# Patient Record
Sex: FEMALE | Race: WHITE | NOT HISPANIC OR LATINO | Employment: FULL TIME | URBAN - METROPOLITAN AREA
[De-identification: names, ages, dates, MRNs, and addresses within clinical notes are randomized per-mention and may not be internally consistent; named-entity substitution may affect disease eponyms.]

---

## 2017-08-10 ENCOUNTER — APPOINTMENT (OUTPATIENT)
Dept: WOMENS IMAGING | Facility: HOSPITAL | Age: 57
End: 2017-08-10

## 2017-08-10 PROCEDURE — 77067 SCR MAMMO BI INCL CAD: CPT | Performed by: RADIOLOGY

## 2018-08-20 ENCOUNTER — APPOINTMENT (OUTPATIENT)
Dept: WOMENS IMAGING | Facility: HOSPITAL | Age: 58
End: 2018-08-20

## 2018-08-20 PROCEDURE — 77067 SCR MAMMO BI INCL CAD: CPT | Performed by: RADIOLOGY

## 2018-08-20 PROCEDURE — 77063 BREAST TOMOSYNTHESIS BI: CPT | Performed by: RADIOLOGY

## 2019-08-21 ENCOUNTER — APPOINTMENT (OUTPATIENT)
Dept: WOMENS IMAGING | Facility: HOSPITAL | Age: 59
End: 2019-08-21

## 2019-08-21 PROCEDURE — 77067 SCR MAMMO BI INCL CAD: CPT | Performed by: RADIOLOGY

## 2019-08-21 PROCEDURE — 77063 BREAST TOMOSYNTHESIS BI: CPT | Performed by: RADIOLOGY

## 2020-08-26 ENCOUNTER — APPOINTMENT (OUTPATIENT)
Dept: WOMENS IMAGING | Facility: HOSPITAL | Age: 60
End: 2020-08-26

## 2020-08-26 PROCEDURE — 77063 BREAST TOMOSYNTHESIS BI: CPT | Performed by: RADIOLOGY

## 2020-08-26 PROCEDURE — 77067 SCR MAMMO BI INCL CAD: CPT | Performed by: RADIOLOGY

## 2021-04-08 ENCOUNTER — PREP FOR SURGERY (OUTPATIENT)
Dept: OTHER | Facility: HOSPITAL | Age: 61
End: 2021-04-08

## 2021-04-08 DIAGNOSIS — Z12.11 SCREEN FOR COLON CANCER: Primary | ICD-10-CM

## 2021-04-09 PROBLEM — Z12.11 SCREEN FOR COLON CANCER: Status: ACTIVE | Noted: 2021-04-09

## 2021-04-16 ENCOUNTER — TRANSCRIBE ORDERS (OUTPATIENT)
Dept: SLEEP MEDICINE | Facility: HOSPITAL | Age: 61
End: 2021-04-16

## 2021-04-16 DIAGNOSIS — Z01.818 OTHER SPECIFIED PRE-OPERATIVE EXAMINATION: Primary | ICD-10-CM

## 2021-05-03 ENCOUNTER — TELEPHONE (OUTPATIENT)
Dept: SURGERY | Facility: CLINIC | Age: 61
End: 2021-05-03

## 2021-05-08 ENCOUNTER — LAB (OUTPATIENT)
Dept: LAB | Facility: HOSPITAL | Age: 61
End: 2021-05-08

## 2021-05-08 DIAGNOSIS — Z01.818 OTHER SPECIFIED PRE-OPERATIVE EXAMINATION: ICD-10-CM

## 2021-05-08 PROCEDURE — U0004 COV-19 TEST NON-CDC HGH THRU: HCPCS

## 2021-05-08 PROCEDURE — C9803 HOPD COVID-19 SPEC COLLECT: HCPCS

## 2021-05-10 LAB — SARS-COV-2 RNA RESP QL NAA+PROBE: NOT DETECTED

## 2021-05-10 RX ORDER — ATORVASTATIN CALCIUM 20 MG/1
20 TABLET, FILM COATED ORAL DAILY
COMMUNITY

## 2021-05-10 RX ORDER — HYDROCHLOROTHIAZIDE 12.5 MG/1
12.5 TABLET ORAL DAILY
COMMUNITY

## 2021-05-10 RX ORDER — BENAZEPRIL HYDROCHLORIDE 10 MG/1
10 TABLET ORAL DAILY
COMMUNITY

## 2021-05-10 RX ORDER — MULTIPLE VITAMINS W/ MINERALS TAB 9MG-400MCG
1 TAB ORAL DAILY
COMMUNITY

## 2021-05-11 ENCOUNTER — ANESTHESIA EVENT (OUTPATIENT)
Dept: GASTROENTEROLOGY | Facility: HOSPITAL | Age: 61
End: 2021-05-11

## 2021-05-11 ENCOUNTER — HOSPITAL ENCOUNTER (OUTPATIENT)
Facility: HOSPITAL | Age: 61
Setting detail: HOSPITAL OUTPATIENT SURGERY
Discharge: HOME | End: 2021-05-11
Attending: SURGERY | Admitting: SURGERY

## 2021-05-11 ENCOUNTER — ANESTHESIA (OUTPATIENT)
Dept: GASTROENTEROLOGY | Facility: HOSPITAL | Age: 61
End: 2021-05-11

## 2021-05-11 VITALS
DIASTOLIC BLOOD PRESSURE: 77 MMHG | BODY MASS INDEX: 34.25 KG/M2 | HEIGHT: 68 IN | RESPIRATION RATE: 16 BRPM | HEART RATE: 81 BPM | WEIGHT: 226 LBS | OXYGEN SATURATION: 99 % | SYSTOLIC BLOOD PRESSURE: 117 MMHG

## 2021-05-11 DIAGNOSIS — Z12.11 SCREEN FOR COLON CANCER: ICD-10-CM

## 2021-05-11 PROCEDURE — 45380 COLONOSCOPY AND BIOPSY: CPT | Performed by: SURGERY

## 2021-05-11 PROCEDURE — 88305 TISSUE EXAM BY PATHOLOGIST: CPT | Performed by: SURGERY

## 2021-05-11 PROCEDURE — 25010000002 PROPOFOL 10 MG/ML EMULSION: Performed by: ANESTHESIOLOGY

## 2021-05-11 PROCEDURE — S0260 H&P FOR SURGERY: HCPCS | Performed by: SURGERY

## 2021-05-11 RX ORDER — PROPOFOL 10 MG/ML
VIAL (ML) INTRAVENOUS AS NEEDED
Status: DISCONTINUED | OUTPATIENT
Start: 2021-05-11 | End: 2021-05-11 | Stop reason: SURG

## 2021-05-11 RX ORDER — SODIUM CHLORIDE, SODIUM LACTATE, POTASSIUM CHLORIDE, CALCIUM CHLORIDE 600; 310; 30; 20 MG/100ML; MG/100ML; MG/100ML; MG/100ML
30 INJECTION, SOLUTION INTRAVENOUS CONTINUOUS PRN
Status: DISCONTINUED | OUTPATIENT
Start: 2021-05-11 | End: 2021-05-11 | Stop reason: HOSPADM

## 2021-05-11 RX ORDER — LIDOCAINE HYDROCHLORIDE 20 MG/ML
INJECTION, SOLUTION INFILTRATION; PERINEURAL AS NEEDED
Status: DISCONTINUED | OUTPATIENT
Start: 2021-05-11 | End: 2021-05-11 | Stop reason: SURG

## 2021-05-11 RX ADMIN — PROPOFOL 50 MG: 10 INJECTION, EMULSION INTRAVENOUS at 12:18

## 2021-05-11 RX ADMIN — LIDOCAINE HYDROCHLORIDE 120 MG: 20 INJECTION, SOLUTION INFILTRATION; PERINEURAL at 12:06

## 2021-05-11 RX ADMIN — PROPOFOL 150 MG: 10 INJECTION, EMULSION INTRAVENOUS at 12:07

## 2021-05-11 RX ADMIN — SODIUM CHLORIDE, POTASSIUM CHLORIDE, SODIUM LACTATE AND CALCIUM CHLORIDE 30 ML/HR: 600; 310; 30; 20 INJECTION, SOLUTION INTRAVENOUS at 11:42

## 2021-05-11 RX ADMIN — PROPOFOL 50 MG: 10 INJECTION, EMULSION INTRAVENOUS at 12:13

## 2021-05-11 RX ADMIN — PROPOFOL 100 MCG/KG/MIN: 10 INJECTION, EMULSION INTRAVENOUS at 12:07

## 2021-05-11 NOTE — ANESTHESIA POSTPROCEDURE EVALUATION
"Patient: Urvashi Lyon    Procedure Summary     Date: 05/11/21 Room / Location: [facility] ENDOSCOPY 1 /  [facility]    Anesthesia Start: 1201 Anesthesia Stop: 1233    Procedure: COLONOSCOPY to cecum with biopsy polypectomy (N/A ) Diagnosis:       Screen for colon cancer      (Screen for colon cancer [Z12.11])    Surgeons: Villa Somers MD Provider: Mateo Machuca MD    Anesthesia Type: MAC ASA Status: 3          Anesthesia Type: MAC    Vitals  Vitals Value Taken Time   /77 05/11/21 1250   Temp     Pulse 81 05/11/21 1250   Resp 16 05/11/21 1250   SpO2 99 % 05/11/21 1250           Post Anesthesia Care and Evaluation    Pain management: adequate  Airway patency: patent  Anesthetic complications: No anesthetic complications    Cardiovascular status: acceptable  Respiratory status: acceptable  Hydration status: acceptable    Comments: /77 (BP Location: Left arm, Patient Position: Sitting)   Pulse 81   Resp 16   Ht 172.7 cm (68\")   Wt 103 kg (226 lb)   SpO2 99%   BMI 34.36 kg/m²       Patient tolerated propofol very well.  NIBP cuff on IV arm, inflated to venous stasis, lidocaine 120 mg given, cuff inflated for 1 minute, followed by propofol 150 mg, no burning felt by patient.      "

## 2021-05-11 NOTE — ANESTHESIA PREPROCEDURE EVALUATION
Anesthesia Evaluation     Patient summary reviewed and Nursing notes reviewed   history of anesthetic complications (severe burning with propofol with colonoscopy (2011) relieved with modified sallie block prior to propofol in 2021): PONV  NPO Solid Status: > 8 hours  NPO Liquid Status: > 2 hours           Airway   Mallampati: I  TM distance: >3 FB  Neck ROM: full  No difficulty expected  Dental - normal exam     Pulmonary - negative pulmonary ROS and normal exam   Cardiovascular - normal exam    (+) hypertension, hyperlipidemia,       Neuro/Psych- negative ROS  GI/Hepatic/Renal/Endo    (+) obesity,  GERD,      Musculoskeletal (-) negative ROS    Abdominal  - normal exam    Bowel sounds: normal.   Substance History - negative use     OB/GYN negative ob/gyn ROS         Other      history of cancer                  Anesthesia Plan    ASA 3     MAC   (Discussed with patient re propofol.  It is not an allergy.  Will attempt modified Sallie block with NIBP cuff for 1 minute prior to propofol)    Anesthetic plan, all risks, benefits, and alternatives have been provided, discussed and informed consent has been obtained with: patient.

## 2021-05-11 NOTE — OP NOTE
Operative Note :   Villa Somers MD    Urvashi Lyon  1960    Procedure Date: 05/11/21    Pre-op Diagnosis:  · Encounter for screening colonoscopy    Post-op Diagnosis:  · Colon polyp    Procedure:   · Colonoscopy to cecum with cold forceps polypectomy    Surgeon: Villa Somers MD      Anesthesia: MAC    Indications:  · 60-year-old female presenting for screening colonoscopy.  Last scope done around 9 years ago, reported as normal.    Findings:   · Benign-appearing polyp in the cecum    Recommendations:   · To be based on polyp pathology, likely recommend repeat scope in 5 years    Description of procedure:    After obtaining informed consent, patient was brought to the endoscopy suite and was sedated.  Digital rectal exam was undertaken and was unremarkable.  The flexible colonoscope was then introduced through the rectum and passed around to the level of the cecum under direct visualization.  There was some tortuosity of the sigmoid colon.  The cecum was visualized by identification of the cecal sling, appendiceal orifice and the ileocecal valve.  The scope was then slowly withdrawn.  On the cecal sling there was a benign-appearing polyp removed completely with 2 bites of the polypectomy forceps.  The ascending colon, hepatic flexure, transverse colon, splenic flexure, descending colon sigmoid colon and rectum were normal.  There were no additional mass lesions, diverticula, strictures or other mucosal changes.    Villa Somers MD  General and Endoscopic Surgery  [facility]    [address] [place] [address], [address]  [place], [place], [address]  P: [phone]  F: [phone]

## 2021-05-11 NOTE — H&P
Chief complaint: Encounter for screening colonoscopy    History of present illness:  60-year-old lady presenting for colonoscopy.  Last colonoscopy was 9 years ago, reported by patient is normal.  Denies any rectal bleeding or complications since that time.  No change in bowel habits or weight loss.  She does have some loose stools, sometimes soon after eating    Past medical history: Uterine cancer, hypertension, hyperlipidemia, gastroesophageal reflux disease    Past surgical history: Hysterectomy 2016, right ankle fracture repair 2016, colonoscopy 2012    Medications: Atorvastatin, benazepril, hydrochlorothiazide, multivitamin    Allergies: Nickel, propofol also reported    Family history: Negative for colorectal cancer (she does have a cousin with colorectal cancer, no first-degree relatives)    Social history: He is a non-smoker    Physical exam:  Body mass index 34.4  General: Awake alert, no distress  Eyes: Extraocular limits are intact, no icterus  Neck: Supple, trachea midline  Respiratory: No use of accessory muscles, good bilateral chest expansion  Cardiovascular: No jugular venous distention or peripheral edema  Gastrointestinal: Abdomen is soft and benign, there is no mass or hernia felt  Extremities: No deformity or edema    Assessment and plan:  -Encounter for screening colonoscopy  -Plan for colonoscopy today, risk including bleeding and perforation discussed, patient agreeable to proceed.    Villa Somers MD  General and Endoscopic Surgery  [facility]    [address], [address]  [place], [place], [address]  P: [phone]  F: [phone]

## 2021-05-12 LAB
LAB AP CASE REPORT: NORMAL
PATH REPORT.FINAL DX SPEC: NORMAL
PATH REPORT.GROSS SPEC: NORMAL

## 2021-05-12 NOTE — PROGRESS NOTES
Please let patient know her polyp was benign and placed her in recall for 5-year repeat colonoscopy.

## 2021-05-13 ENCOUNTER — TELEPHONE (OUTPATIENT)
Dept: SURGERY | Facility: CLINIC | Age: 61
End: 2021-05-13

## 2021-05-13 NOTE — TELEPHONE ENCOUNTER
----- Message from Villa Somers MD sent at 5/12/2021 12:33 PM EDT -----  Please let patient know her polyp was benign and placed her in recall for 5-year repeat colonoscopy.

## 2021-08-31 ENCOUNTER — TELEPHONE (OUTPATIENT)
Dept: NEUROLOGY | Facility: OTHER | Age: 61
End: 2021-08-31

## 2021-08-31 NOTE — TELEPHONE ENCOUNTER
Dr. brown's office called to verify patient had been scheduled for neuro. I let them know we have patient scheduled for January with

## 2021-09-13 ENCOUNTER — APPOINTMENT (OUTPATIENT)
Dept: WOMENS IMAGING | Facility: HOSPITAL | Age: 61
End: 2021-09-13

## 2021-09-13 PROCEDURE — 77063 BREAST TOMOSYNTHESIS BI: CPT | Performed by: RADIOLOGY

## 2021-09-13 PROCEDURE — 77067 SCR MAMMO BI INCL CAD: CPT | Performed by: RADIOLOGY

## 2022-01-04 ENCOUNTER — OFFICE VISIT (OUTPATIENT)
Dept: NEUROLOGY | Facility: CLINIC | Age: 62
End: 2022-01-04

## 2022-01-04 ENCOUNTER — LAB (OUTPATIENT)
Dept: LAB | Facility: HOSPITAL | Age: 62
End: 2022-01-04

## 2022-01-04 VITALS
WEIGHT: 202 LBS | HEIGHT: 68 IN | SYSTOLIC BLOOD PRESSURE: 124 MMHG | HEART RATE: 90 BPM | OXYGEN SATURATION: 97 % | BODY MASS INDEX: 30.62 KG/M2 | DIASTOLIC BLOOD PRESSURE: 80 MMHG

## 2022-01-04 DIAGNOSIS — T45.1X5A CHEMOTHERAPY-INDUCED PERIPHERAL NEUROPATHY: Primary | ICD-10-CM

## 2022-01-04 DIAGNOSIS — G62.0 CHEMOTHERAPY-INDUCED PERIPHERAL NEUROPATHY: ICD-10-CM

## 2022-01-04 DIAGNOSIS — T45.1X5A CHEMOTHERAPY-INDUCED PERIPHERAL NEUROPATHY: ICD-10-CM

## 2022-01-04 DIAGNOSIS — G62.0 CHEMOTHERAPY-INDUCED PERIPHERAL NEUROPATHY: Primary | ICD-10-CM

## 2022-01-04 PROBLEM — L25.9 CONTACT DERMATITIS: Status: ACTIVE | Noted: 2017-11-20

## 2022-01-04 PROBLEM — C76.3 SEROUS CARCINOMA OF FEMALE PELVIS: Status: ACTIVE | Noted: 2018-05-11

## 2022-01-04 PROBLEM — L72.0 EPIDERMOID CYST OF SKIN: Status: ACTIVE | Noted: 2017-05-25

## 2022-01-04 PROBLEM — Z85.42 HISTORY OF MALIGNANT NEOPLASM OF ENDOMETRIUM: Status: ACTIVE | Noted: 2017-03-31

## 2022-01-04 PROBLEM — R06.02 SHORTNESS OF BREATH: Status: ACTIVE | Noted: 2020-05-28

## 2022-01-04 PROBLEM — M19.072 ARTHRITIS OF FOOT, LEFT: Status: ACTIVE | Noted: 2019-01-31

## 2022-01-04 PROBLEM — S82.851A CLOSED TRIMALLEOLAR FRACTURE OF RIGHT ANKLE: Status: ACTIVE | Noted: 2019-08-13

## 2022-01-04 PROBLEM — Z76.89 PERSONS ENCOUNTERING HEALTH SERVICES IN OTHER SPECIFIED CIRCUMSTANCES: Status: ACTIVE | Noted: 2018-05-22

## 2022-01-04 PROBLEM — R49.0 CHRONIC HOARSENESS: Status: ACTIVE | Noted: 2019-04-04

## 2022-01-04 PROBLEM — S41.119A LACERATION OF UPPER LIMB: Status: ACTIVE | Noted: 2020-09-08

## 2022-01-04 PROBLEM — E66.9 OBESITY (BMI 30-39.9): Status: ACTIVE | Noted: 2019-01-31

## 2022-01-04 PROBLEM — G62.9 PERIPHERAL NERVE DISEASE: Status: ACTIVE | Noted: 2022-01-04

## 2022-01-04 PROBLEM — S93.06XA CLOSED DISLOCATION OF ANKLE: Status: ACTIVE | Noted: 2019-08-10

## 2022-01-04 PROBLEM — J30.9 ALLERGIC RHINITIS: Status: ACTIVE | Noted: 2020-07-15

## 2022-01-04 PROBLEM — M85.80 OSTEOPENIA: Status: ACTIVE | Noted: 2020-05-28

## 2022-01-04 LAB
ERYTHROCYTE [SEDIMENTATION RATE] IN BLOOD: 35 MM/HR (ref 0–30)
FOLATE SERPL-MCNC: >20 NG/ML (ref 4.78–24.2)
HBA1C MFR BLD: 5.66 % (ref 4.8–5.6)
VIT B12 BLD-MCNC: 695 PG/ML (ref 211–946)

## 2022-01-04 PROCEDURE — 99203 OFFICE O/P NEW LOW 30 MIN: CPT | Performed by: PSYCHIATRY & NEUROLOGY

## 2022-01-04 PROCEDURE — 36415 COLL VENOUS BLD VENIPUNCTURE: CPT | Performed by: PSYCHIATRY & NEUROLOGY

## 2022-01-04 PROCEDURE — 82595 ASSAY OF CRYOGLOBULIN: CPT

## 2022-01-04 PROCEDURE — 85652 RBC SED RATE AUTOMATED: CPT | Performed by: PSYCHIATRY & NEUROLOGY

## 2022-01-04 PROCEDURE — 83655 ASSAY OF LEAD: CPT | Performed by: PSYCHIATRY & NEUROLOGY

## 2022-01-04 PROCEDURE — 82525 ASSAY OF COPPER: CPT

## 2022-01-04 PROCEDURE — 83036 HEMOGLOBIN GLYCOSYLATED A1C: CPT | Performed by: PSYCHIATRY & NEUROLOGY

## 2022-01-04 PROCEDURE — 83825 ASSAY OF MERCURY: CPT | Performed by: PSYCHIATRY & NEUROLOGY

## 2022-01-04 PROCEDURE — 82175 ASSAY OF ARSENIC: CPT | Performed by: PSYCHIATRY & NEUROLOGY

## 2022-01-04 PROCEDURE — 84165 PROTEIN E-PHORESIS SERUM: CPT | Performed by: PSYCHIATRY & NEUROLOGY

## 2022-01-04 PROCEDURE — 82607 VITAMIN B-12: CPT | Performed by: PSYCHIATRY & NEUROLOGY

## 2022-01-04 PROCEDURE — 82784 ASSAY IGA/IGD/IGG/IGM EACH: CPT

## 2022-01-04 PROCEDURE — 86592 SYPHILIS TEST NON-TREP QUAL: CPT | Performed by: PSYCHIATRY & NEUROLOGY

## 2022-01-04 PROCEDURE — 86334 IMMUNOFIX E-PHORESIS SERUM: CPT

## 2022-01-04 PROCEDURE — 84155 ASSAY OF PROTEIN SERUM: CPT | Performed by: PSYCHIATRY & NEUROLOGY

## 2022-01-04 PROCEDURE — 82746 ASSAY OF FOLIC ACID SERUM: CPT | Performed by: PSYCHIATRY & NEUROLOGY

## 2022-01-04 NOTE — PROGRESS NOTES
CC: Peripheral neuropathy    HPI:  Urvashi Lyon is a  61 y.o.  right-handed white female who was sent by Dr. Barba for neurologic consultation regarding peripheral neuropathy.  She has history of stage I uterine carcinoma in 2016 treated surgically and with chemotherapy.  She does not recall which chemo agents were used but by the end of her 6 infusion treatment she noticed numbness and tingling in her toes.  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  Then in August 2019 she fell down some steps and fractured her right ankle requiring surgery and badly sprained the left ankle.  Her surgery was done by Dr. Mitchell and the patient states that after surgery she noted fairly dense numbness in the right great toe as well as numbness further back along the medial aspect of the right foot to the ankle.  This has not improved.  The patient also relates a previous fall while at a roller rink in 1990 which caused a disc herniation which was not sufficient for surgery.    The patient subsequently saw Dr. Barba and went through physical therapy and also obtain an EMG done at [facility], [place].  Results of this are not available to me at present.  She then had an MRI of the lumbar spine 2/16/21 which demonstrated a synovial cyst on the right at L3/4 as well as some additional milder stenosis.  I reviewed the films and agree with the report.  I do not feel that the synovial cyst is currently, as of the date of this film, causing severe stenosis.    The patient states sometimes she wakes up with numbness in the hands..  She indicates that she had an epidural by Dr. Fonseca which seemed to help some of the numbness in the right lateral foot but not any of the more persistent more severe numbness in the right medial foot and great toe.        Past Medical History:   Diagnosis  Date   • Cancer (HCC) 2016    uterine stage 1A   • GERD (gastroesophageal reflux disease)    • Hyperlipidemia    • Hypertension    • PONV (postoperative nausea and vomiting)          Past Surgical History:   Procedure Laterality Date   • COLONOSCOPY     • COLONOSCOPY N/A 5/11/2021    Procedure: COLONOSCOPY to cecum with biopsy polypectomy;  Surgeon: Villa Somers MD;  Location: [facility] ENDOSCOPY;  Service: General;  Laterality: N/A;  pre- screening   post- polyp    • ENDOSCOPY     • FRACTURE SURGERY Right 2016    ankle   • HYSTERECTOMY  2016   • TONSILLECTOMY             Current Outpatient Medications:   •  atorvastatin (LIPITOR) 20 MG tablet, Take 20 mg by mouth Daily., Disp: , Rfl:   •  benazepril (LOTENSIN) 10 MG tablet, Take 10 mg by mouth Daily., Disp: , Rfl:   •  hydroCHLOROthiazide (HYDRODIURIL) 12.5 MG oral, Take 12.5 mg by mouth Daily., Disp: , Rfl:   •  multivitamin with minerals (MULTIVITAMIN ADULT PO), Take 1 tablet by mouth Daily., Disp: , Rfl:       Family History   Problem Relation Age of Onset   • Aneurysm Mother    • Dementia Mother    • Alzheimer's disease Father    • Stroke Maternal Grandmother          Social History     Socioeconomic History   • Marital status:    Tobacco Use   • Smoking status: Never Smoker   • Smokeless tobacco: Never Used   Substance and Sexual Activity   • Alcohol use: Never   • Sexual activity: Defer         Allergies   Allergen Reactions   • Nickel Itching         Pain Scale: 3/10        ROS:  Review of Systems   Constitutional: Negative for activity change, appetite change and fatigue.   HENT: Negative for ear pain, facial swelling and hearing loss.    Eyes: Negative for pain, redness and itching.   Respiratory: Negative for cough, choking and shortness of breath.    Cardiovascular: Negative for chest pain and leg swelling.   Gastrointestinal: Negative for abdominal pain, nausea and vomiting.   Endocrine: Negative for cold intolerance, heat intolerance and  "polydipsia.   Musculoskeletal: Negative for arthralgias, back pain and joint swelling.   Skin: Negative for color change, pallor and rash.   Allergic/Immunologic: Negative for environmental allergies and food allergies.   Neurological: Positive for numbness. Negative for dizziness, tremors, seizures, syncope, facial asymmetry, speech difficulty, weakness, light-headedness and headaches.   Psychiatric/Behavioral: Negative for agitation, behavioral problems, confusion, decreased concentration, dysphoric mood, hallucinations, self-injury, sleep disturbance and suicidal ideas. The patient is not nervous/anxious and is not hyperactive.          I have reviewed and agree with the above ROS completed by the medical assistant.      Physical Exam:  Vitals:    01/04/22 1434   BP: 124/80   Pulse: 90   SpO2: 97%   Weight: 91.6 kg (202 lb)   Height: 172.7 cm (68\")     Orthostatic BP:    Body mass index is 30.71 kg/m².    Physical Exam  General: Overweight white female no acute distress  HEENT: Normocephalic no evidence of trauma.  Discs flat.  No AV nicking.  Throat negative  Neck: Supple.  No thyromegaly.  No cervical bruits  Heart: Regular rate and rhythm no murmurs.  No pedal edema.  Extremities: Radial pulses strong and simultaneous.  Straight leg raising in the seated position was negative bilaterally to past 90 degrees      Neurological Exam:   Mental Status: Awake, alert, oriented to person, place and time.  Conversant without evidence of an affective disorder, thought disorder, delusions or hallucinations.  Attention span and concentration are normal.  HCF: No aphasia, apraxia or dysarthria.  Recent and remote memory intact.  Knowledge of recent events intact.  CN: I:   II: Visual fields full without left inattention   III, IV, VI: Eye movements intact without nystagmus or ptosis.  Pupils equal round and reactive to light.   V,VII: Light touch and pinprick intact all 3 divisions of V.  Facial muscles symmetrical.   VIII: " Hearing intact to finger rub   IX,X: Soft palate elevates symmetrically   XI: Sternomastoid and trapezius are strong.   XII: Tongue midline without atrophy or fasciculations  Motor: Normal tone and bulk in the upper and lower extremities   Power testing: Mild weakness of the interossei bilaterally with normal power in all other muscles tested in the upper extremities.  In the lower extremities there is mild weakness of toe extension but not of toe flexion.  All other muscles tested in the lower extremities were normal.  Reflexes: Upper extremities: +1 diffusely        Lower extremities: +1 diffusely        Toe signs: Downgoing bilaterally  Sensory: Light touch: Normal in the upper extremities.  In the lower extremities there is reduced sensation on the right great toe but otherwise intact sensation.        Pinprick: Reduced on the right great toe otherwise intact over the arms and legs        Vibration: Intact at the ankles        Position: Intact at the great toes    Cerebellar: Finger-to-nose: Intact           Rapid movement: Intact           Heel-to-shin: Intact  Gait and Station: Casual walk mildly broad-based.  Toe walking seems to give out some but otherwise does seem strong.  Heel walking was intact.  Unsteady on tandem walking but was able to perform it.  No Romberg no drift    Results:      Lab Results   Component Value Date    BUN 19 08/15/2019    CREATININE 0.8 08/15/2019    BCR 23.8 08/15/2019    CO2 27 08/15/2019    CALCIUM 9.1 08/15/2019       Lab Results   Component Value Date    WBC 6.40 08/15/2019    HGB 11.4 (L) 08/15/2019    HCT 35.3 (L) 08/15/2019    MCV 91.2 08/15/2019     08/15/2019         .No results found for: RPR      No results found for: TSH, Y3RIYRB, E7PDFNF, THYROIDAB      Lab Results   Component Value Date    EURCJJOW45 695 01/04/2022         Lab Results   Component Value Date    FOLATE >20.00 01/04/2022         Lab Results   Component Value Date    HGBA1C 5.66 (H) 01/04/2022          Lab Results   Component Value Date    BUN 19 08/15/2019    CREATININE 0.8 08/15/2019    BCR 23.8 08/15/2019    K 3.9 08/15/2019    CO2 27 08/15/2019    CALCIUM 9.1 08/15/2019         Lab Results   Component Value Date    WBC 6.40 08/15/2019    HGB 11.4 (L) 08/15/2019    HCT 35.3 (L) 08/15/2019    MCV 91.2 08/15/2019     08/15/2019             Assessment:   1.  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  Lumbar degenerative disc disease with right L3/4 synovial cyst which does not appear to be reducing any severe stenosis.  Clinically the patient is not presenting with sciatica          Plan:  1.  Obtain previous EMG done at [facility]  2.  Screen for additional causes of neuropathy  3.  Follow-up with ANALI Devine in about a month      Time: 45 minutes                  Dictated utilizing Dragon dictation.

## 2022-01-05 LAB
ALBUMIN SERPL ELPH-MCNC: 4 G/DL (ref 2.9–4.4)
ALBUMIN/GLOB SERPL: 1.3 {RATIO} (ref 0.7–1.7)
ALPHA1 GLOB SERPL ELPH-MCNC: 0.2 G/DL (ref 0–0.4)
ALPHA2 GLOB SERPL ELPH-MCNC: 0.8 G/DL (ref 0.4–1)
B-GLOBULIN SERPL ELPH-MCNC: 1.1 G/DL (ref 0.7–1.3)
GAMMA GLOB SERPL ELPH-MCNC: 1 G/DL (ref 0.4–1.8)
GLOBULIN SER CALC-MCNC: 3.2 G/DL (ref 2.2–3.9)
IGA SERPL-MCNC: 241 MG/DL (ref 87–352)
IGG SERPL-MCNC: 958 MG/DL (ref 586–1602)
IGM SERPL-MCNC: 79 MG/DL (ref 26–217)
LABORATORY COMMENT REPORT: NORMAL
M PROTEIN SERPL ELPH-MCNC: NORMAL G/DL
PROT PATTERN SERPL ELPH-IMP: NORMAL
PROT PATTERN SERPL IFE-IMP: NORMAL
PROT SERPL-MCNC: 7.2 G/DL (ref 6–8.5)
RPR SER QL: NORMAL

## 2022-01-06 LAB
ARSENIC BLD-MCNC: 2 UG/L (ref 2–23)
COPPER SERPL-MCNC: 124 UG/DL (ref 80–158)
LEAD BLDV-MCNC: 1 UG/DL (ref 0–4)
MERCURY BLD-MCNC: 1.9 UG/L (ref 0–14.9)

## 2022-01-10 LAB — CRYOGLOB SER QL 1D COLD INC: NORMAL

## 2022-01-13 ENCOUNTER — PATIENT ROUNDING (BHMG ONLY) (OUTPATIENT)
Dept: NEUROLOGY | Facility: CLINIC | Age: 62
End: 2022-01-13

## 2022-02-01 ENCOUNTER — OFFICE VISIT (OUTPATIENT)
Dept: NEUROLOGY | Facility: CLINIC | Age: 62
End: 2022-02-01

## 2022-02-01 VITALS
WEIGHT: 209 LBS | SYSTOLIC BLOOD PRESSURE: 128 MMHG | DIASTOLIC BLOOD PRESSURE: 80 MMHG | BODY MASS INDEX: 31.67 KG/M2 | HEART RATE: 86 BPM | OXYGEN SATURATION: 98 % | HEIGHT: 68 IN

## 2022-02-01 DIAGNOSIS — G62.0 CHEMOTHERAPY-INDUCED PERIPHERAL NEUROPATHY: Primary | ICD-10-CM

## 2022-02-01 DIAGNOSIS — M51.36 LUMBAR DEGENERATIVE DISC DISEASE: ICD-10-CM

## 2022-02-01 DIAGNOSIS — T45.1X5A CHEMOTHERAPY-INDUCED PERIPHERAL NEUROPATHY: Primary | ICD-10-CM

## 2022-02-01 DIAGNOSIS — R20.2 PARESTHESIAS: ICD-10-CM

## 2022-02-01 PROCEDURE — 99215 OFFICE O/P EST HI 40 MIN: CPT | Performed by: PHYSICIAN ASSISTANT

## 2022-02-01 NOTE — PROGRESS NOTES
CC: Follow-up paresthesias involving right foot    HPI:  Urvashi Lyon is a  61 y.o.  right-handed female who I am seeing today in follow-up regarding some paresthesias she is having mostly involving her right foot.  Patient's other past medical history is notable for stage I uterine carcinoma in 2016 treated surgically and with chemotherapy, hypertension, hyperlipidemia and GERD.  Patient was last seen by Dr. Baker on 1/4/2022, a summary of condition is taken from previous note with amendments and additions as needed:    Patient was initially referred for neurological consultation per her orthopedic DrLawrence Barba regarding some suspicion for peripheral neuropathy.    As mentioned she has a history of uterine cancer which was treated in part with some chemo though patient does not recall which chemo agents were used.  She states by the end of her 6 infusion treatment she noticed numbness and tingling in her toes, but reports this was very minimal.      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  Then in August 2019 she fell down some steps and fractured her right ankle requiring surgery and badly sprained the left ankle.  Her surgery was done by Dr. Mitchell and the patient states that after surgery she noted fairly dense numbness in the right great toe as well as numbness further back along the medial aspect of the right foot to the ankle.  This has not improved. The patient also relates a remote fall while while rollerskating 1990 which caused a disc herniation which was not sufficient for surgery.     The patient subsequently saw Dr. Barba and went through physical therapy and also obtain an EMG done at [facility] on 1/29/2021. I reviewed the data sheet today, details and impression that suggests a lumbar polyradiculopathy involving bilateral L5/S1 nerve  roots.  There is no electrophysiological evidence of a right lower extremity mononeuropathy at this time.   Per the record there was reduced amplitude of the bilateral peritoneal motor responses right greater than left, mildly reduced conduction velocity through the leg on the right  There was also reduced amplitude of the bilateral tibial motor responses, right greater than left.  Mildly reduced conduction velocity through the leg on the right  There was prolonged right peroneal F-wave response, a prolonged left tibial F-wave response  -Needle exam showed large and small amplitude denervation potentials noted in the bilateral peroneus longus, medial gastrocnemius and posterior tibialis, also noted in the left anterior tibialis    She then had an MRI of the lumbar spine 2/16/21 which demonstrated multiple level lumbar degenerative change, a synovial cyst on the right at L3/4 as well as some additional milder stenosis.  Per Dr. Baker, it is unlikely that the synovial cyst is currently, as of the date of this film, causing severe stenosis.  On 1/4/2022 patient underwent laboratory studies looking for treatable causes of peripheral neuropathy, results were as follows:    RPR was nonreactive  Cryoglobulins negative  Heavy metals including lead arsenic and mercury were all within normal limits  Copper level normal at 124  B12 and folate normal at 695 and 20 respectively  Hemoglobin A1c slightly elevated at 5.66  Sed rate slightly elevated at 35  IEP/SPEP showed no evidence of monoclonal gammopathy     The patient states sometimes she wakes up with numbness in the hands, but states this is really quite minimal. She indicates that she had an epidural by Dr. Fonseca which seemed to help some of the numbness in the right lateral foot but not any of the more persistent more severe numbness in the right medial foot and great toe.         Past Medical History:   Diagnosis Date   • Cancer (HCC) 2016    uterine stage 1A   • GERD  (gastroesophageal reflux disease)    • Hyperlipidemia    • Hypertension    • PONV (postoperative nausea and vomiting)          Past Surgical History:   Procedure Laterality Date   • COLONOSCOPY     • COLONOSCOPY N/A 5/11/2021    Procedure: COLONOSCOPY to cecum with biopsy polypectomy;  Surgeon: Villa Somers MD;  [facility] ENDOSCOPY;  Service: General;  Laterality: N/A;  pre- screening   post- polyp    • ENDOSCOPY     • FRACTURE SURGERY Right 2016    ankle   • HYSTERECTOMY  2016   • TONSILLECTOMY             Current Outpatient Medications:   •  atorvastatin (LIPITOR) 20 MG tablet, Take 20 mg by mouth Daily., Disp: , Rfl:   •  benazepril (LOTENSIN) 10 MG tablet, Take 10 mg by mouth Daily., Disp: , Rfl:   •  hydroCHLOROthiazide (HYDRODIURIL) 12.5 MG oral, Take 12.5 mg by mouth Daily., Disp: , Rfl:   •  multivitamin with minerals (MULTIVITAMIN ADULT PO), Take 1 tablet by mouth Daily., Disp: , Rfl:       Family History   Problem Relation Age of Onset   • Aneurysm Mother    • Dementia Mother    • Alzheimer's disease Father    • Stroke Maternal Grandmother          Social History     Socioeconomic History   • Marital status:    Tobacco Use   • Smoking status: Never Smoker   • Smokeless tobacco: Never Used   Substance and Sexual Activity   • Alcohol use: Never   • Sexual activity: Defer         Allergies   Allergen Reactions   • Nickel Itching           ROS:  Review of Systems   Constitutional: Negative for activity change, appetite change and fatigue.   Eyes: Negative for pain, redness and itching.   Respiratory: Negative for cough, choking and shortness of breath.    Allergic/Immunologic: Negative for environmental allergies and food allergies.   Neurological: Positive for numbness. Negative for dizziness, tremors, seizures, syncope, facial asymmetry, speech difficulty, weakness, light-headedness and headaches.   Psychiatric/Behavioral: Negative for agitation, behavioral problems, confusion, decreased  "concentration, dysphoric mood, hallucinations, self-injury, sleep disturbance and suicidal ideas. The patient is not nervous/anxious and is not hyperactive.      I have reviewed the above ROS put in by the medical assistant and am in agreement.     Physical Exam:  Vitals:    02/01/22 0917   BP: 128/80   Pulse: 86   SpO2: 98%   Weight: 94.8 kg (209 lb)   Height: 172.7 cm (68\")       Body mass index is 31.78 kg/m².    Physical Exam  General: Overweight white female no acute distress  HEENT: Normocephalic no evidence of trauma.    Neck: Supple.   Heart: Regular rate and rhythm no murmurs.    Extremities: No pedal edema.  Distal pulses are palpable and equal     Neurological Exam:   Mental Status: Awake, alert, oriented to person, place and time.  Conversant without evidence of an affective disorder, thought disorder, delusions or hallucinations.  Attention span and concentration are normal.  HCF: No aphasia, apraxia or dysarthria.  Recent and remote memory intact.  Knowledge of recent events intact.  CN:      I:              II: Visual fields full without left inattention              III, IV, VI: Eye movements intact without nystagmus or ptosis.  Pupils equal round and reactive to light.              V,VII: Light touch and pinprick intact all 3 divisions of V.  Facial muscles symmetrical.              VIII: Hearing intact to finger rub              IX,X: Soft palate elevates symmetrically              XI: Sternomastoid and trapezius are strong.              XII: Tongue midline without atrophy or fasciculations  Motor: Normal tone and bulk in the upper and lower extremities              Power testing: Mild weakness of the interossei bilaterally with normal power in all other muscles tested in the upper extremities.  In the lower extremities there is mild weakness of toe extension but not of toe flexion.  All other muscles tested in the lower extremities were normal.  Reflexes: Upper extremities: +1 diffusely            "        Lower extremities: +1 diffusely  Sensory: Light touch: Normal in the upper extremities.  In the lower extremities there is reduced sensation on the right great toe but otherwise intact sensation.                   Pinprick: Reduced on the right great toe otherwise intact over the arms and legs  Cerebellar: Finger-to-nose: Intact                      Rapid movement: Intact                      Heel-to-shin: Intact  Gait and Station: Casual walk mildly broad-based.  Toe walking seems to give out some but otherwise does seem strong.  Heel walking was intact.  Unsteady on tandem walking but was able to perform it.  No Romberg no drift  Results:      Lab Results   Component Value Date    BUN 19 08/15/2019    CREATININE 0.8 08/15/2019    BCR 23.8 08/15/2019    CO2 27 08/15/2019    CALCIUM 9.1 08/15/2019    PROTENTOTREF 7.2 01/04/2022    ALBUMIN 4.0 01/04/2022    LABIL2 1.3 01/04/2022       Lab Results   Component Value Date    WBC 6.40 08/15/2019    HGB 11.4 (L) 08/15/2019    HCT 35.3 (L) 08/15/2019    MCV 91.2 08/15/2019     08/15/2019         .  Lab Results   Component Value Date    RPR Non-Reactive 01/04/2022         No results found for: TSH, Q3HYDAZ, M5UYWCF, THYROIDAB      Lab Results   Component Value Date    CHCNNLUJ93 695 01/04/2022         Lab Results   Component Value Date    FOLATE >20.00 01/04/2022         Lab Results   Component Value Date    HGBA1C 5.66 (H) 01/04/2022         Lab Results   Component Value Date    BUN 19 08/15/2019    CREATININE 0.8 08/15/2019    BCR 23.8 08/15/2019    K 3.9 08/15/2019    CO2 27 08/15/2019    CALCIUM 9.1 08/15/2019    PROTENTOTREF 7.2 01/04/2022    ALBUMIN 4.0 01/04/2022    LABIL2 1.3 01/04/2022         Lab Results   Component Value Date    WBC 6.40 08/15/2019    HGB 11.4 (L) 08/15/2019    HCT 35.3 (L) 08/15/2019    MCV 91.2 08/15/2019     08/15/2019             Assessment:   1.  Paresthesias mainly involving her right great toe and medial aspect of her  right foot  2.  Some symptoms of a diffuse peripheral neuropathy possibly due to chemotherapy agent, with no evidence for this on EMG  3.  Lumbar degenerative disc disease with history of some lumbosacral radiculopathy on the right, currently stable      Plan:  1.  Discussed results of EMG done per Familia, apparently there was no evidence for mononeuropathy/tarsal tunnel, also showed nothing consistent with a more diffuse polyneuropathy.  It did indicate presence of an L5/S1 radiculopathy bilaterally however patient really does not have symptoms of this at present.  We also reviewed her labs, no evidence for treatable cause of peripheral neuropathy.  Given history of chemo, certainly there could be some diffuse polyneuropathy present which may be just did not show up on EMG.   -But as far as the paresthesias involving her right great toe/medial aspect of her right foot, she is not really complaining of painful symptoms, more just numbness and tingling as such I do not think she would benefit terribly from gabapentin or Lyrica.  I did offer some physical therapy to see if they could work with her on her gait, balance however patient states she was to hold off on this for now.    2.  Would recommend that she continue to be followed by us periodically for reevaluation, see if there is progression of a diffuse polyneuropathy.     3.  And finally, right now back pain seems quiescent.  No further work-up needed at this time.      Plan for follow-up in 1 year or sooner should need arise      Time 40 minutes              Dictated utilizing Dragon dictation.

## 2022-03-18 ENCOUNTER — TELEPHONE (OUTPATIENT)
Dept: NEUROLOGY | Facility: CLINIC | Age: 62
End: 2022-03-18

## 2022-03-18 NOTE — TELEPHONE ENCOUNTER
Pt calling wanting to  cd of back, called practice s/farheen doe and she states pt can  cd today. I did let pt know she can  cd today. Pt states that she will be here around 3:30pm

## 2022-09-22 ENCOUNTER — TELEPHONE (OUTPATIENT)
Dept: NEUROLOGY | Facility: CLINIC | Age: 62
End: 2022-09-22

## 2022-09-22 NOTE — TELEPHONE ENCOUNTER
LVM with pt to reschedule appt in Feb 2023. Provider Lia is not with out office anymore, so appt was switched to Dr. Baker. If pt has any questions or concerns to call office. Letter with new appt info has been mailed.

## 2022-09-28 ENCOUNTER — APPOINTMENT (OUTPATIENT)
Dept: WOMENS IMAGING | Facility: HOSPITAL | Age: 62
End: 2022-09-28

## 2022-09-28 PROCEDURE — 77067 SCR MAMMO BI INCL CAD: CPT | Performed by: RADIOLOGY

## 2022-09-28 PROCEDURE — 77063 BREAST TOMOSYNTHESIS BI: CPT | Performed by: RADIOLOGY

## 2023-02-09 ENCOUNTER — OFFICE VISIT (OUTPATIENT)
Dept: NEUROLOGY | Facility: CLINIC | Age: 63
End: 2023-02-09
Payer: COMMERCIAL

## 2023-02-09 VITALS
SYSTOLIC BLOOD PRESSURE: 122 MMHG | DIASTOLIC BLOOD PRESSURE: 78 MMHG | HEART RATE: 84 BPM | BODY MASS INDEX: 36.83 KG/M2 | OXYGEN SATURATION: 97 % | HEIGHT: 68 IN | WEIGHT: 243 LBS

## 2023-02-09 DIAGNOSIS — G62.0 CHEMOTHERAPY-INDUCED PERIPHERAL NEUROPATHY: Primary | ICD-10-CM

## 2023-02-09 DIAGNOSIS — T45.1X5A CHEMOTHERAPY-INDUCED PERIPHERAL NEUROPATHY: Primary | ICD-10-CM

## 2023-02-09 DIAGNOSIS — M54.31 SCIATICA WITHOUT BACK PAIN, RIGHT: ICD-10-CM

## 2023-02-09 PROCEDURE — 99214 OFFICE O/P EST MOD 30 MIN: CPT | Performed by: PSYCHIATRY & NEUROLOGY

## 2023-02-09 NOTE — PROGRESS NOTES
CC: Chemotherapy-induced peripheral neuropathy    HPI:  Urvashi Lyon is a  62 y.o. white female who I am seeing in follow-up with chemotherapy-induced peripheral neuropathy.  The patient has history of breast cancer status post surgery treated with Taxol and carboplatin in 2016.  By the end of treatment she apparently was having some numbness in her toes.  The patient has had additional problems of a fracture of the right ankle about a year after her chemotherapy.  She was operated on by Dr. Mitchell and then was seen by Dr. Cruz.  She describes mostly persistent numbness in the right foot mostly medially and big toe which was notably worse after the surgery she states.  She gets some tingling in the left foot.  She has minimal numbness and tingling in the hands and sometimes wakes up with her hands numb.  She specifically denies low back pain.    She states that she had a recent fall 1/22/2023 and landed on her knees.  No obvious swelling.  She has had knee pain upon getting up from a chair in both knees since then.  The one knee popped 1's but they are not grinding or clicking she says.  When she fell she did the splits and she notices more pain and tenderness in the medial aspect of both knees.        Past Medical History:   Diagnosis Date   • Cancer (HCC) 2016    uterine stage 1A   • GERD (gastroesophageal reflux disease)    • Hyperlipidemia    • Hypertension    • PONV (postoperative nausea and vomiting)          Past Surgical History:   Procedure Laterality Date   • COLONOSCOPY     • COLONOSCOPY N/A 5/11/2021    Procedure: COLONOSCOPY to cecum with biopsy polypectomy;  Surgeon: Villa Somers MD;  Location: [facility] ENDOSCOPY;  Service: General;  Laterality: N/A;  pre- screening   post- polyp    • ENDOSCOPY     • FRACTURE SURGERY Right 2016    ankle   • HYSTERECTOMY  2016   • TONSILLECTOMY             Current Outpatient Medications:   •  atorvastatin (LIPITOR) 20 MG tablet, Take 20 mg by mouth Daily.,  "Disp: , Rfl:   •  benazepril (LOTENSIN) 10 MG tablet, Take 10 mg by mouth Daily., Disp: , Rfl:   •  hydroCHLOROthiazide (HYDRODIURIL) 12.5 MG oral, Take 12.5 mg by mouth Daily., Disp: , Rfl:   •  multivitamin with minerals tablet tablet, Take 1 tablet by mouth Daily., Disp: , Rfl:       Family History   Problem Relation Age of Onset   • Aneurysm Mother    • Dementia Mother    • Alzheimer's disease Father    • Stroke Maternal Grandmother          Social History     Socioeconomic History   • Marital status:    Tobacco Use   • Smoking status: Never   • Smokeless tobacco: Never   Substance and Sexual Activity   • Alcohol use: Never   • Sexual activity: Defer         Allergies   Allergen Reactions   • Nickel Itching         Pain Scale: 2 or 3/10        ROS:  Review of Systems   Musculoskeletal: Positive for gait problem (had a fall 2 weeks ago). Negative for back pain, neck pain and neck stiffness.   Allergic/Immunologic: Negative for environmental allergies, food allergies and immunocompromised state.   Neurological: Positive for numbness (feet). Negative for dizziness, tremors, seizures, syncope, facial asymmetry, speech difficulty, weakness, light-headedness and headaches.   Hematological: Negative for adenopathy. Does not bruise/bleed easily.   Psychiatric/Behavioral: Negative for confusion, decreased concentration and sleep disturbance. The patient is not nervous/anxious.          I have reviewed and agree with the above ROS completed by the medical assistant.      Physical Exam:  Vitals:    02/09/23 0808   Weight: 110 kg (243 lb)   Height: 172.7 cm (68\")     Orthostatic BP:    Body mass index is 36.95 kg/m².    Physical Exam  General: M obese white female no acute distress  HEENT: Normocephalic no evidence of trauma.  Neck: Supple.  No thyromegaly.  Heart: Regular rate and rhythm  Extremities: No pedal edema.  Some tenderness along the medial aspects of both knees.  No obvious swelling or bruising.  Anterior " and posterior drawer signs were negative.  No clicks in the knee with bent knee flexion with rotation of the knees medially or laterally.      Neurological Exam:   Mental Status: Awake, alert, oriented to person, place and time.  Conversant without evidence of an affective disorder, thought disorder, delusions or hallucinations.  Attention span and concentration are normal.  HCF: No aphasia, apraxia or dysarthria.  Recent and remote memory intact.  Knowledge of recent events intact.  CN: I:   II: Visual fields full without left inattention   III, IV, VI: Eye movements intact without nystagmus or ptosis.  Pupils equal         round and reactive to light.   V,VII: Light touch and pinprick intact all 3 divisions of V.  Facial muscles symmetrical.   VIII: Hearing intact to finger rub   IX,X: Soft palate elevates symmetrically   XI: Sternomastoid and trapezius are strong.   XII: Tongue midline without atrophy or fasciculations  Motor: Normal tone and bulk in the upper and lower extremities; however there appears to be some atrophy in the abductor Halikis muscle right foot   Power testing: In the upper extremities there was full power in all muscles tested.  In the lower extremities on the left there was full power in all muscles tested.  Mild right great toe extensor weakness  Reflexes: Upper extremities: +1 diffusely        Lower extremities: +1 knee jerks trace ankle jerk        Toe signs: Downgoing  Sensory: Light touch: Some reduction in the right great toe and medial foot        Pinprick: Similar to light touch        Vibration: Intact at the ankles        Position: Intact at the great toes    Cerebellar: Finger-to-nose: Intact           Rapid movement: Intact           Heel-to-shin: Intact  Gait and Station: Comes to stand with some  difficulty with complaints of knee pain bilaterally.  On her feet very little knee pain she states.  Can walk on toes and heels.  Tandem walking is mildly abnormal.  No Romberg no  drift    Results:      Lab Results   Component Value Date    BUN 19 08/15/2019    CREATININE 0.8 08/15/2019    BCR 23.8 08/15/2019    CO2 27 08/15/2019    CALCIUM 9.1 08/15/2019    PROTENTOTREF 7.2 01/04/2022    ALBUMIN 4.0 01/04/2022    LABIL2 1.3 01/04/2022       Lab Results   Component Value Date    WBC 6.40 08/15/2019    HGB 11.4 (L) 08/15/2019    HCT 35.3 (L) 08/15/2019    MCV 91.2 08/15/2019     08/15/2019         .  Lab Results   Component Value Date    RPR Non-Reactive 01/04/2022         No results found for: TSH, N3VJAPD, M5NXKCN, THYROIDAB      Lab Results   Component Value Date    [id number] 695 01/04/2022         Lab Results   Component Value Date    FOLATE >20.00 01/04/2022         Lab Results   Component Value Date    HGBA1C 5.66 (H) 01/04/2022         Lab Results   Component Value Date    BUN 19 08/15/2019    CREATININE 0.8 08/15/2019    BCR 23.8 08/15/2019    K 3.9 08/15/2019    CO2 27 08/15/2019    CALCIUM 9.1 08/15/2019    PROTENTOTREF 7.2 01/04/2022    ALBUMIN 4.0 01/04/2022    LABIL2 1.3 01/04/2022         Lab Results   Component Value Date    WBC 6.40 08/15/2019    HGB 11.4 (L) 08/15/2019    HCT 35.3 (L) 08/15/2019    MCV 91.2 08/15/2019     08/15/2019             Assessment:   1.  Chemotherapy-induced peripheral neuropathy, mild  2.  Superimposed worse numbness in the right foot.  Doubt a superimposed lumbosacral radiculopathy based on previous MRI report however the patient does have a synovial cyst but at present I do not see exam findings that fit with it causing nerve damage.        Plan:  1.  At this point I doubt she will get further improvement in the right foot numbness.  I doubt further investigation will lead to a different diagnosis that would be treated differently.  Although she did have on MRI a synovial cyst which conceivably could lead to problems particularly at the L3 or L4 level I do not see the evidence on exam for a radiculopathy of L3 or L4 and so I would not  pursue a repeat MRI of her lumbar spine based on that.  We could conceivably repeat her EMG but again I told her that I did not think it would change what we did and so I do not really recommend it.  2.  Return as needed      Time: 30 minutes                    Dictated utilizing Dragon dictation.

## 2024-06-03 ENCOUNTER — TELEPHONE (OUTPATIENT)
Dept: NEUROLOGY | Facility: CLINIC | Age: 64
End: 2024-06-03

## 2024-06-03 NOTE — TELEPHONE ENCOUNTER
Provider: SERGIO HOOPER    Caller: PATIENT    Relationship to Patient: SELF    Phone Number: [phone]    Reason for Call: PT WAS LOOKING AT HER OV NOTE FROM 2/9/23 IN [facility] AND IT STATED IN HPI THAT SHE HAD A HX OF BREAST CANCER AND THIS IS INCORRECT.  SHE HAD UTERINE CANCER.  SHE WOULD LIKE THIS CHANGED IN HER CHART PLEASE.      ALSO, SHE WANTED TO KNOW THE DX RELATED TO HER BACK THAT THE PROVIDER HAD TALKED WITH HER ABOUT.    PLEASE REVIEW AND ADVISE     THANK YOU

## 2024-06-06 ENCOUNTER — TELEPHONE (OUTPATIENT)
Dept: SURGERY | Facility: CLINIC | Age: 64
End: 2024-06-06
Payer: COMMERCIAL

## 2024-06-06 NOTE — TELEPHONE ENCOUNTER
Caller: Urvashi Lyon     Relationship: PATIENT     Best call back number: [phone]    What is your medical concern? PATIENT HAD COLONOSCOPY BACK IN  AND WAS TOLD TO COME BACK IN 5 YEARS. HER SISTER HAS JUST BEEN DX WITH COLON CANCER STAGE 3 AND HER COUSIN  OF COLON CANCER AS WELL. PATIENT HAS ALREADY HAD UTERINE CANCER IN 2016. SHE WOULD LIKE TO HAVE HER COLONOSCOPY DONE SOONER IF POSSIBLE. PLEASE CALL.

## 2024-06-07 NOTE — TELEPHONE ENCOUNTER
Left message for patient with Dr. Somers's response. Advised that I will place a packet in the mail today and instructed pt to mail it back once completed.

## 2024-06-27 ENCOUNTER — PREP FOR SURGERY (OUTPATIENT)
Dept: OTHER | Facility: HOSPITAL | Age: 64
End: 2024-06-27
Payer: COMMERCIAL

## 2024-06-27 DIAGNOSIS — Z86.010 HISTORY OF ADENOMATOUS POLYP OF COLON: Primary | ICD-10-CM

## 2024-06-27 DIAGNOSIS — Z80.0 FAMILY HISTORY OF COLON CANCER: ICD-10-CM

## 2024-07-05 PROBLEM — Z80.0 FAMILY HISTORY OF COLON CANCER: Status: ACTIVE | Noted: 2024-06-27

## 2024-07-05 PROBLEM — Z86.0101 HISTORY OF ADENOMATOUS POLYP OF COLON: Status: ACTIVE | Noted: 2024-06-27

## 2024-07-05 PROBLEM — Z86.010 HISTORY OF ADENOMATOUS POLYP OF COLON: Status: ACTIVE | Noted: 2024-06-27

## 2024-09-12 ENCOUNTER — ANESTHESIA EVENT (OUTPATIENT)
Dept: GASTROENTEROLOGY | Facility: HOSPITAL | Age: 64
End: 2024-09-12
Payer: COMMERCIAL

## 2024-09-12 ENCOUNTER — ANESTHESIA (OUTPATIENT)
Dept: GASTROENTEROLOGY | Facility: HOSPITAL | Age: 64
End: 2024-09-12
Payer: COMMERCIAL

## 2024-09-12 ENCOUNTER — HOSPITAL ENCOUNTER (OUTPATIENT)
Facility: HOSPITAL | Age: 64
Setting detail: HOSPITAL OUTPATIENT SURGERY
Discharge: HOME | End: 2024-09-12
Attending: SURGERY | Admitting: SURGERY
Payer: COMMERCIAL

## 2024-09-12 VITALS
DIASTOLIC BLOOD PRESSURE: 101 MMHG | TEMPERATURE: 98 F | HEIGHT: 68 IN | SYSTOLIC BLOOD PRESSURE: 147 MMHG | HEART RATE: 73 BPM | RESPIRATION RATE: 16 BRPM | OXYGEN SATURATION: 97 % | WEIGHT: 224 LBS | BODY MASS INDEX: 33.95 KG/M2

## 2024-09-12 PROCEDURE — 25010000002 PROPOFOL 200 MG/20ML EMULSION

## 2024-09-12 PROCEDURE — S0260 H&P FOR SURGERY: HCPCS | Performed by: SURGERY

## 2024-09-12 PROCEDURE — 25010000002 PROPOFOL 1000 MG/100ML EMULSION

## 2024-09-12 PROCEDURE — 45378 DIAGNOSTIC COLONOSCOPY: CPT | Performed by: SURGERY

## 2024-09-12 PROCEDURE — 25810000003 LACTATED RINGERS PER 1000 ML: Performed by: SURGERY

## 2024-09-12 RX ORDER — PROPOFOL 10 MG/ML
INJECTION, EMULSION INTRAVENOUS AS NEEDED
Status: DISCONTINUED | OUTPATIENT
Start: 2024-09-12 | End: 2024-09-12 | Stop reason: SURG

## 2024-09-12 RX ORDER — PROPOFOL 10 MG/ML
INJECTION, EMULSION INTRAVENOUS CONTINUOUS PRN
Status: DISCONTINUED | OUTPATIENT
Start: 2024-09-12 | End: 2024-09-12 | Stop reason: SURG

## 2024-09-12 RX ORDER — LIDOCAINE HYDROCHLORIDE 20 MG/ML
INJECTION, SOLUTION INFILTRATION; PERINEURAL AS NEEDED
Status: DISCONTINUED | OUTPATIENT
Start: 2024-09-12 | End: 2024-09-12 | Stop reason: SURG

## 2024-09-12 RX ORDER — SODIUM CHLORIDE, SODIUM LACTATE, POTASSIUM CHLORIDE, CALCIUM CHLORIDE 600; 310; 30; 20 MG/100ML; MG/100ML; MG/100ML; MG/100ML
30 INJECTION, SOLUTION INTRAVENOUS CONTINUOUS PRN
Status: DISCONTINUED | OUTPATIENT
Start: 2024-09-12 | End: 2024-09-12 | Stop reason: HOSPADM

## 2024-09-12 RX ADMIN — PROPOFOL INJECTABLE EMULSION 50 MG: 10 INJECTION, EMULSION INTRAVENOUS at 08:11

## 2024-09-12 RX ADMIN — SODIUM CHLORIDE, POTASSIUM CHLORIDE, SODIUM LACTATE AND CALCIUM CHLORIDE 30 ML/HR: 600; 310; 30; 20 INJECTION, SOLUTION INTRAVENOUS at 07:51

## 2024-09-12 RX ADMIN — PROPOFOL 200 MCG/KG/MIN: 10 INJECTION, EMULSION INTRAVENOUS at 08:12

## 2024-09-12 RX ADMIN — LIDOCAINE HYDROCHLORIDE 100 MG: 20 INJECTION, SOLUTION INFILTRATION; PERINEURAL at 08:11

## 2024-09-12 NOTE — OP NOTE
Operative Note :   Villa Somers MD    Urvashi Lyon  1960    Procedure Date: 09/12/24    Pre-op Diagnosis:  Personal history of colon polyps  Family history of colon cancer    Post-op Diagnosis:  Diverticulosis    Procedure:   Colonoscopy to terminal ileum    Surgeon: Villa Somers MD      Anesthesia: MAC    Indications:  63-year-old female who last underwent colonoscopy a little over 3 years ago with finding of adenomatous polyp at that time.  In the interim, her sister has been diagnosed with colon cancer.    Findings:   Few scattered diverticula in the sigmoid colon    Recommendations:   Repeat colonoscopy in 5 years based on family history    Description of procedure:    After obtaining informed consent, patient was brought to the endoscopy suite and was sedated.  Digital rectal exam was undertaken and was unremarkable.  The flexible colonoscope was then introduced through the rectum and passed around to the level of the cecum under direct visualization.  The ileocecal valve and appendiceal orifice appeared normal.  The terminal ileum was intubated and advanced about 15 to 20 cm and the visualized ileal mucosa was unremarkable.  Scope was pulled back to the cecum which was normal as was the remainder of the ascending colon, hepatic flexure, transverse colon, splenic flexure and descending colon.  In the sigmoid colon there were a couple of scattered diverticula.  The rectum was unremarkable.  Scope was withdrawn completely.  The patient tolerated this well.    Villa Somers MD  General and Endoscopic Surgery  [facility]    [address] [place] [address], [address]  [place], [place], [address]  P: [phone]  F: [phone]

## 2024-09-12 NOTE — H&P
Cc: Personal history of colon polyps    History of presenting illness: 63-year-old female presenting for colonoscopy.  There is a history of colon polyp at her last colonoscopy around 3 years ago.  Since I last saw her 3 years ago, her sister was diagnosed with colon cancer.    Past medical history: Anxiety, gastroesophageal reflux disease, uterine cancer, hypertension, hyperlipidemia    Past surgical history: Colonoscopy last done May 2021 with finding of adenomatous polyp, history of hysterectomy, tonsillectomy, ankle fracture surgery    Medications: Atorvastatin, benazepril, hydrochlorothiazide    Allergies: Nickel    Social history: She is a non-smoker    Family history: sister with history of colon cancer.    Physical exam:  Body mass index: 34.1  General: Awake and alert, no distress  Head: Normocephalic, atraumatic  Neck: Supple, trachea midline  Eyes: Extraocular movements intact, no icterus  Respiratory: No use of accessory muscles, good bilateral chest expansion  Abdomen: Soft, benign, no hernia felt  Skin: Warm and dry      Assessment and plan:  -Personal history of colon polyps  -Plan for colonoscopy today, risks include bleeding and perforation, patient agreeable to proceed    Villa Somers MD  General and Endoscopic Surgery  [facility]    [address], [address]  [place], [place], [address]  P: [phone]  F: [phone]    
Please make appointment in 5-6 weeks with Dr. Leblanc.

## 2024-09-12 NOTE — ANESTHESIA POSTPROCEDURE EVALUATION
Patient: Urvashi Lyon    Procedure Summary       Date: 09/12/24 Room / Location: [facility] ENDOSCOPY 1 /  [facility]    Anesthesia Start: 0807 Anesthesia Stop: 0837    Procedure: COLONOSCOPY to cecum and TI Diagnosis:       History of adenomatous polyp of colon      Family history of colon cancer      (History of adenomatous polyp of colon [Z86.010])      (Family history of colon cancer [Z80.0])    Surgeons: Villa Somesr MD Provider: Davis Moreno MD    Anesthesia Type: MAC ASA Status: 3            Anesthesia Type: MAC    Vitals  Vitals Value Taken Time   /101 09/12/24 0853   Temp     Pulse 69 09/12/24 0853   Resp 16 09/12/24 0852   SpO2 98 % 09/12/24 0853   Vitals shown include unfiled device data.        Post Anesthesia Care and Evaluation    Patient location during evaluation: PACU  Patient participation: complete - patient participated  Level of consciousness: awake and alert  Pain management: adequate    Airway patency: patent  Anesthetic complications: No anesthetic complications    Cardiovascular status: acceptable  Respiratory status: acceptable  Hydration status: acceptable    Comments: ---------------------            09/12/24                0852      ---------------------   BP:     (!) 147/101   Pulse:      73        Resp:       16        Temp:                 SpO2:       97%      ---------------------

## 2024-09-12 NOTE — ANESTHESIA PREPROCEDURE EVALUATION
Anesthesia Evaluation     Patient summary reviewed and Nursing notes reviewed                Airway   Mallampati: II  Dental      Pulmonary    (+) ,shortness of breath  Cardiovascular     Rhythm: regular  Rate: normal    (+) hypertension, hyperlipidemia      Neuro/Psych  (+) numbness, psychiatric history Anxiety  GI/Hepatic/Renal/Endo    (+) morbid obesity, GERD    Musculoskeletal     Abdominal    Substance History - negative use     OB/GYN negative ob/gyn ROS         Other   arthritis,   history of cancer (endometrial)                  Anesthesia Plan    ASA 3     MAC     intravenous induction     Anesthetic plan, risks, benefits, and alternatives have been provided, discussed and informed consent has been obtained with: patient.    CODE STATUS:

## 2024-09-12 NOTE — DISCHARGE INSTRUCTIONS
For the next 24 hours patient needs to be with a responsible adult.    For 24 hours DO NOT drive, operate machinery, appliances, drink alcohol, make important decisions or sign legal documents.    Start with a light or bland diet if you are feeling sick to your stomach otherwise advance to regular diet as tolerated.    Follow recommendations on procedure report if provided by your doctor.    Call Dr Somers for problems [phone].    Problems may include but not limited to: large amounts of bleeding, trouble breathing, repeated vomiting, severe unrelieved pain, fever or chills.

## 2024-10-29 ENCOUNTER — APPOINTMENT (OUTPATIENT)
Dept: GENERAL RADIOLOGY | Facility: HOSPITAL | Age: 64
End: 2024-10-29
Payer: COMMERCIAL

## 2024-10-29 ENCOUNTER — HOSPITAL ENCOUNTER (EMERGENCY)
Facility: HOSPITAL | Age: 64
Discharge: HOME | End: 2024-10-29
Attending: EMERGENCY MEDICINE | Admitting: EMERGENCY MEDICINE
Payer: COMMERCIAL

## 2024-10-29 ENCOUNTER — APPOINTMENT (OUTPATIENT)
Dept: CT IMAGING | Facility: HOSPITAL | Age: 64
End: 2024-10-29
Payer: COMMERCIAL

## 2024-10-29 VITALS
TEMPERATURE: 97.6 F | DIASTOLIC BLOOD PRESSURE: 87 MMHG | OXYGEN SATURATION: 97 % | HEART RATE: 78 BPM | SYSTOLIC BLOOD PRESSURE: 133 MMHG | RESPIRATION RATE: 16 BRPM

## 2024-10-29 DIAGNOSIS — F41.9 ANXIETY: ICD-10-CM

## 2024-10-29 DIAGNOSIS — R13.10 DYSPHAGIA, UNSPECIFIED TYPE: Primary | ICD-10-CM

## 2024-10-29 LAB
ALBUMIN SERPL-MCNC: 4.1 G/DL (ref 3.5–5.2)
ALBUMIN/GLOB SERPL: 1.3 G/DL
ALP SERPL-CCNC: 97 U/L (ref 39–117)
ALT SERPL W P-5'-P-CCNC: 19 U/L (ref 1–33)
ANION GAP SERPL CALCULATED.3IONS-SCNC: 12.2 MMOL/L (ref 5–15)
AST SERPL-CCNC: 20 U/L (ref 1–32)
BASOPHILS # BLD AUTO: 0.04 10*3/MM3 (ref 0–0.2)
BASOPHILS NFR BLD AUTO: 0.5 % (ref 0–1.5)
BILIRUB SERPL-MCNC: 0.5 MG/DL (ref 0–1.2)
BUN SERPL-MCNC: 16 MG/DL (ref 8–23)
BUN/CREAT SERPL: 16.5 (ref 7–25)
CALCIUM SPEC-SCNC: 9.6 MG/DL (ref 8.6–10.5)
CHLORIDE SERPL-SCNC: 102 MMOL/L (ref 98–107)
CO2 SERPL-SCNC: 25.8 MMOL/L (ref 22–29)
CREAT SERPL-MCNC: 0.97 MG/DL (ref 0.57–1)
DEPRECATED RDW RBC AUTO: 40.8 FL (ref 37–54)
EGFRCR SERPLBLD CKD-EPI 2021: 65.4 ML/MIN/1.73
EOSINOPHIL # BLD AUTO: 0.06 10*3/MM3 (ref 0–0.4)
EOSINOPHIL NFR BLD AUTO: 0.8 % (ref 0.3–6.2)
ERYTHROCYTE [DISTWIDTH] IN BLOOD BY AUTOMATED COUNT: 12.8 % (ref 12.3–15.4)
GLOBULIN UR ELPH-MCNC: 3.2 GM/DL
GLUCOSE SERPL-MCNC: 106 MG/DL (ref 65–99)
HCT VFR BLD AUTO: 42.4 % (ref 34–46.6)
HGB BLD-MCNC: 14 G/DL (ref 12–15.9)
IMM GRANULOCYTES # BLD AUTO: 0.02 10*3/MM3 (ref 0–0.05)
IMM GRANULOCYTES NFR BLD AUTO: 0.3 % (ref 0–0.5)
LYMPHOCYTES # BLD AUTO: 1.16 10*3/MM3 (ref 0.7–3.1)
LYMPHOCYTES NFR BLD AUTO: 14.8 % (ref 19.6–45.3)
MAGNESIUM SERPL-MCNC: 2 MG/DL (ref 1.6–2.4)
MCH RBC QN AUTO: 29 PG (ref 26.6–33)
MCHC RBC AUTO-ENTMCNC: 33 G/DL (ref 31.5–35.7)
MCV RBC AUTO: 87.8 FL (ref 79–97)
MONOCYTES # BLD AUTO: 0.52 10*3/MM3 (ref 0.1–0.9)
MONOCYTES NFR BLD AUTO: 6.6 % (ref 5–12)
NEUTROPHILS NFR BLD AUTO: 6.05 10*3/MM3 (ref 1.7–7)
NEUTROPHILS NFR BLD AUTO: 77 % (ref 42.7–76)
NRBC BLD AUTO-RTO: 0 /100 WBC (ref 0–0.2)
PLATELET # BLD AUTO: 267 10*3/MM3 (ref 140–450)
PMV BLD AUTO: 9.5 FL (ref 6–12)
POTASSIUM SERPL-SCNC: 3.9 MMOL/L (ref 3.5–5.2)
PROT SERPL-MCNC: 7.3 G/DL (ref 6–8.5)
RBC # BLD AUTO: 4.83 10*6/MM3 (ref 3.77–5.28)
SODIUM SERPL-SCNC: 140 MMOL/L (ref 136–145)
WBC NRBC COR # BLD AUTO: 7.85 10*3/MM3 (ref 3.4–10.8)

## 2024-10-29 PROCEDURE — 25510000001 IOPAMIDOL 61 % SOLUTION: Performed by: EMERGENCY MEDICINE

## 2024-10-29 PROCEDURE — 83735 ASSAY OF MAGNESIUM: CPT | Performed by: EMERGENCY MEDICINE

## 2024-10-29 PROCEDURE — 80053 COMPREHEN METABOLIC PANEL: CPT | Performed by: EMERGENCY MEDICINE

## 2024-10-29 PROCEDURE — 99285 EMERGENCY DEPT VISIT HI MDM: CPT

## 2024-10-29 PROCEDURE — 85025 COMPLETE CBC W/AUTO DIFF WBC: CPT | Performed by: EMERGENCY MEDICINE

## 2024-10-29 PROCEDURE — 74221 X-RAY XM ESOPHAGUS 2CNTRST: CPT

## 2024-10-29 PROCEDURE — 71045 X-RAY EXAM CHEST 1 VIEW: CPT

## 2024-10-29 PROCEDURE — 70491 CT SOFT TISSUE NECK W/DYE: CPT

## 2024-10-29 RX ORDER — SODIUM CHLORIDE 0.9 % (FLUSH) 0.9 %
10 SYRINGE (ML) INJECTION AS NEEDED
Status: DISCONTINUED | OUTPATIENT
Start: 2024-10-29 | End: 2024-10-29 | Stop reason: HOSPADM

## 2024-10-29 RX ORDER — IOPAMIDOL 612 MG/ML
75 INJECTION, SOLUTION INTRAVASCULAR
Status: COMPLETED | OUTPATIENT
Start: 2024-10-29 | End: 2024-10-29

## 2024-10-29 RX ADMIN — IOPAMIDOL 75 ML: 612 INJECTION, SOLUTION INTRAVENOUS at 13:14

## 2024-10-29 RX ADMIN — ANTACID/ANTIFLATULENT 1 PACKET: 380; 550; 10; 10 GRANULE, EFFERVESCENT ORAL at 14:03

## 2024-10-29 RX ADMIN — BARIUM SULFATE 135 ML: 980 POWDER, FOR SUSPENSION ORAL at 14:03

## 2024-10-29 RX ADMIN — BARIUM SULFATE 183 ML: 960 POWDER, FOR SUSPENSION ORAL at 14:03

## 2024-10-29 RX ADMIN — BARIUM SULFATE 700 MG: 700 TABLET ORAL at 14:03

## 2024-10-29 NOTE — DISCHARGE INSTRUCTIONS
As we discussed due liquid diet or soft diet.  Only take small bites.  Chew food very well and moisten food prior to swallowing.  Call [facility] gastroenterology above to arrange follow-up.  Return if worsening of symptoms not able to tolerate liquids or solids, or any other concerns.

## 2024-10-29 NOTE — ED NOTES
Patient to ER via car from home for difficulty swallowing x a week   Patient is able to drink   Patient in NAD at time of triage

## 2024-10-29 NOTE — ED PROVIDER NOTES
EMERGENCY DEPARTMENT ENCOUNTER    Room Number:  32/32  Date of encounter:  10/29/2024  PCP: Charles Bradshaw MD  Historian: Patient  Relevant information and history provided by sources other than the patient will be included below and in the ED Course.  Review of pertinent past medical records may also be included in record below and ED Course.    HPI:  Chief Complaint: Difficulty swallowing  A complete HPI/ROS/PMH/PSH/SH/FH are unobtainable due to: Not applicable  Context: Urvashi Lyon is a 64 y.o. female who presents to the ED c/o this patient symptoms started about 1 week ago.  Started getting the sensation that something was in her throat.  Had a little discomfort when she tried to swallow.  The symptoms gradually progressed and worsening over the past 48 hours.  She tried to have some rice 2 nights ago when she tried to swallow the right she felt like it would not come go down.  She was able to handle her secretions.  She was able to drink coffee in the coffee and liquid went down without a problem.  Since that symptoms started they have been fairly persistent.  Patient did consume white [place] last night.  Again she has the sensation that it does not go down.  But she does not vomit up or spit up any food.  She is able to swallow the liquids as well.  She is reported no swelling to her neck or mouth or throat.  She reports no fevers or chills.  She does not believe that any food got stuck in her throat.  She is currently under tremendous amount of stress.  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  She had a history of a similar episode about 30 years ago had an esophagram which was unremarkable and they thought it was due to panic and anxiety.  She was recently seen at [facility] for panic and anxiety.  She was seen by cardiology she had a Holter and an echo.  According to her there was no acute abnormality and they thought her symptoms were related to anxiety and panic.  Denies  chest pain denies shortness of breath.  She again is handling her secretions.  Denies fevers or chills or any trauma.  She has been on BenzePrO for almost 20 years with no change in her dose.        Previous Episodes: See above.  This could be due to anxiety.  But she is concerned there might be something else going on.  Current Symptoms: See above    MEDICAL HISTORY REVIEWED  Patient was seen in urgent care center yesterday at [facility] she was there because she felt like something was stuck in her throat  When I look at the records from yesterday I can see according the records they state that she was hospitalized 2 months ago for panic attack.  She had a cardiac workup at [facility] in [facility] it sounds like.  She saw cardiologist as well  I can see she has a history of hypertension hyperlipidemia she is on an ACE inhibitor.  It looks like she had an echo done at [facility] on 9/30/2024 when I try to open up the results it states that the attachment is not available so I cannot see the echo results.      PAST MEDICAL HISTORY  Active Ambulatory Problems     Diagnosis Date Noted    Screen for colon cancer 04/09/2021    History of malignant neoplasm of endometrium 03/31/2017    Hematuria 06/16/2016    Fatigue 05/19/2015    Epidermoid cyst of skin 05/25/2017    Contact dermatitis 11/20/2017    Closed trimalleolar fracture of right ankle 08/13/2019    Closed dislocation of ankle 08/10/2019    Chronic hoarseness 04/04/2019    Chemotherapy-induced neuropathy 01/31/2019    Arthritis of left foot 01/31/2019    Anxiety about health 07/06/2016    Allergic rhinitis 07/15/2020    Hyperlipidemia 05/19/2015    Hypertension 08/01/2014    Laceration of upper limb 09/08/2020    Primary malignant neoplasm of pelvis 05/11/2018    Microscopic hematuria 03/09/2016    Obesity (BMI 30-39.9) 01/31/2019    Osteopenia 05/28/2020    Pain in toe 05/19/2015    Adenocarcinoma of endometrium 07/06/2016    Peripheral neuropathy 01/04/2022     Persons encountering health services in other specified circumstances 05/22/2018    Shortness of breath 05/28/2020    Urinary tract infectious disease 03/07/2016    Vomiting and diarrhea 03/07/2016    Abnormal bone density screening 08/01/2014    History of adenomatous polyp of colon 06/27/2024    Family history of colon cancer 06/27/2024     Resolved Ambulatory Problems     Diagnosis Date Noted    No Resolved Ambulatory Problems     Past Medical History:   Diagnosis Date    Cancer 2016    CHUNG (generalized anxiety disorder)     GERD (gastroesophageal reflux disease)     Panic attack          PAST SURGICAL HISTORY  Past Surgical History:   Procedure Laterality Date    COLONOSCOPY      COLONOSCOPY N/A 5/11/2021    Procedure: COLONOSCOPY to cecum with biopsy polypectomy;  Surgeon: Villa Somers MD;  Location:  [facility];  Service: General;  Laterality: N/A;  pre- screening   post- polyp     COLONOSCOPY N/A 9/12/2024    Procedure: COLONOSCOPY to cecum and TI;  Surgeon: Villa Somers MD;  Location:  [facility];  Service: General;  Laterality: N/A;  PRE - family hx colon ca  POST - diverticulosis    ENDOSCOPY      FRACTURE SURGERY Right 2016    ankle    HYSTERECTOMY  2016    TONSILLECTOMY           FAMILY HISTORY  Family History   Problem Relation Age of Onset    Aneurysm Mother     Dementia Mother     Alzheimer's disease Father     Stroke Maternal Grandmother     Malig Hyperthermia Neg Hx          SOCIAL HISTORY  Social History     Socioeconomic History    Marital status:    Tobacco Use    Smoking status: Never    Smokeless tobacco: Never   Vaping Use    Vaping status: Never Used   Substance and Sexual Activity    Alcohol use: Never    Drug use: Never    Sexual activity: Defer         ALLERGIES  Nickel        REVIEW OF SYSTEMS  Review of Systems     All systems reviewed and negative except for those discussed in HPI.       PHYSICAL EXAM    I have reviewed the triage vital signs and nursing  notes.    ED Triage Vitals   Temp Heart Rate Resp BP SpO2   10/29/24 1133 10/29/24 1133 10/29/24 1133 10/29/24 1151 10/29/24 1133   97.6 °F (36.4 °C) 103 16 (!) 142/106 96 %      Temp src Heart Rate Source Patient Position BP Location FiO2 (%)   -- -- -- 10/29/24 1151 --      Right arm        GENERAL: She is a very anxious female.  She at times is tearful.  No acute distress.Vital signs on my initial evaluation unremarkable.  HENT: nares patent  Head/neck/ face are symmetric without gross deformity, signs of trauma, or swelling  EYES: no scleral icterus, no conjunctival pallor.  Oropharyngeal exam.  There is no trismus.  Handling secretions well.  There is no swelling or oropharynx appears symmetric.  There is no signs of edema or angioedema.  Tongue is appropriate size and not swollen.  No rash or exudate.  NECK: Supple, no meningismus.  External exam both anterior and posterior portion of her neck is unremarkable there is no swelling I do not appreciate any adenopathy  CV: regular rhythm, regular rate with intact distal pulses.  RESPIRATORY: normal effort and no respiratory distress.  To auscultation bilaterally  ABDOMEN: soft and nontender.  Obese  MUSCULOSKELETAL: no deformity.  Intact distal pulses that are equal strong and symmetric.  NEURO: alert and appropriate, moves all extremities, follows commands.  No focal motor or sensory changes  SKIN: warm, dry    Vital signs and nursing notes reviewed.        LAB RESULTS  Recent Results (from the past 24 hours)   Comprehensive Metabolic Panel    Collection Time: 10/29/24 12:17 PM    Specimen: Blood   Result Value Ref Range    Glucose 106 (H) 65 - 99 mg/dL    BUN 16 8 - 23 mg/dL    Creatinine 0.97 0.57 - 1.00 mg/dL    Sodium 140 136 - 145 mmol/L    Potassium 3.9 3.5 - 5.2 mmol/L    Chloride 102 98 - 107 mmol/L    CO2 25.8 22.0 - 29.0 mmol/L    Calcium 9.6 8.6 - 10.5 mg/dL    Total Protein 7.3 6.0 - 8.5 g/dL    Albumin 4.1 3.5 - 5.2 g/dL    ALT (SGPT) 19 1 - 33 U/L     AST (SGOT) 20 1 - 32 U/L    Alkaline Phosphatase 97 39 - 117 U/L    Total Bilirubin 0.5 0.0 - 1.2 mg/dL    Globulin 3.2 gm/dL    A/G Ratio 1.3 g/dL    BUN/Creatinine Ratio 16.5 7.0 - 25.0    Anion Gap 12.2 5.0 - 15.0 mmol/L    eGFR 65.4 >60.0 mL/min/1.73   Magnesium    Collection Time: 10/29/24 12:17 PM    Specimen: Blood   Result Value Ref Range    Magnesium 2.0 1.6 - 2.4 mg/dL   CBC Auto Differential    Collection Time: 10/29/24 12:17 PM    Specimen: Blood   Result Value Ref Range    WBC 7.85 3.40 - 10.80 10*3/mm3    RBC 4.83 3.77 - 5.28 10*6/mm3    Hemoglobin 14.0 12.0 - 15.9 g/dL    Hematocrit 42.4 34.0 - 46.6 %    MCV 87.8 79.0 - 97.0 fL    MCH 29.0 26.6 - 33.0 pg    MCHC 33.0 31.5 - 35.7 g/dL    RDW 12.8 12.3 - 15.4 %    RDW-SD 40.8 37.0 - 54.0 fl    MPV 9.5 6.0 - 12.0 fL    Platelets 267 140 - 450 10*3/mm3    Neutrophil % 77.0 (H) 42.7 - 76.0 %    Lymphocyte % 14.8 (L) 19.6 - 45.3 %    Monocyte % 6.6 5.0 - 12.0 %    Eosinophil % 0.8 0.3 - 6.2 %    Basophil % 0.5 0.0 - 1.5 %    Immature Grans % 0.3 0.0 - 0.5 %    Neutrophils, Absolute 6.05 1.70 - 7.00 10*3/mm3    Lymphocytes, Absolute 1.16 0.70 - 3.10 10*3/mm3    Monocytes, Absolute 0.52 0.10 - 0.90 10*3/mm3    Eosinophils, Absolute 0.06 0.00 - 0.40 10*3/mm3    Basophils, Absolute 0.04 0.00 - 0.20 10*3/mm3    Immature Grans, Absolute 0.02 0.00 - 0.05 10*3/mm3    nRBC 0.0 0.0 - 0.2 /100 WBC       Ordered the above labs and independently reviewed the results.        RADIOLOGY  FL ESOPHAGRAM DOUBLE CONTRAST    Result Date: 10/29/2024  PROCEDURE: FL ESOPHAGRAM DOUBLE CONTRAST-  HISTORY: Solid dysphagia  TECHNIQUE:  A double phase barium esophagram was performed.  Fluoroscopy time: 1 minute 35 seconds # images obtained: 326 Dose area product: 17 micro gray x square meter  COMPARISON: CT soft tissue neck 10/29/2024  FINDINGS: Laryngeal penetration without aspiration with swallowing. Esophagus is well distended and normal in course and caliber. No evidence of  narrowing/stricture. Mucosal granularity and prominent esophageal folds throughout the mid and distal esophagus. Mild esophageal dysmotility. Small sliding-type hiatal hernia. A 13 mm barium tablet passed promptly through the esophagus into the stomach.  Images were obtained by LARRY Robert.          1. No evidence of esophageal narrowing/stricture. 2. Mucosal granularity and prominent esophageal folds throughout the mid and distal esophagus can be seen with esophagitis. Consider follow-up nonemergent upper endoscopy if not recently performed. 3. Small sliding-type hiatal hernia. 4. Laryngeal penetration without aspiration with swallowing.    This report was finalized on 10/29/2024 4:28 PM by Dr. Randall Gomez M.D on Workstation: [id number]      CT Soft Tissue Neck With Contrast    Result Date: 10/29/2024  CT SCAN OF THE SOFT TISSUES OF THE NECK WITH CONTRAST  CLINICAL HISTORY:  Trouble swallowing.  Some discomfort with swallowing.  Extreme anxiety  TECHNIQUE: CT scan of the soft tissues of neck was obtained with 3 mm axial images following the administration of IV contrast. Sagittal and coronal reconstructed images were obtained.  FINDINGS:  The V1 segment of the left vertebral artery is small relative to the remaining cervical segments. This suggests pathology within the left vertebral artery. If there is any cause for concern that the patient's dysphagia is secondary to a lateral medullary infarct, further evaluation could be performed with an MRI of the brain. Further evaluation of the left vertebral artery itself could be performed with CT angiography, as clinically indicated.  The visualized contents of the cranial vault and orbits are within normal limits. The parotid glands are incompletely visualized. The visualized parotid glands, submandibular glands, and sublingual glands are within normal limits. The remaining oral cavity is unremarkable. The nasopharynx, oropharynx, hypopharynx, cervical  esophagus, and upper thoracic esophagus are within normal limits. The aryepiglottic folds, epiglottis, glottis, subglottic airway, and trachea are within normal limits. The thyroid gland is incidentally notable for subcentimeter discrete nodules within both lobes. The largest of these measures up to approximately 7 mm in diameter per ACR White paper guidelines, no specific radiographic follow-up is indicated. The visualized lung apices are clear.       The V1 segment of the left vertebral artery is small relative to the remaining cervical segments. This suggests pathology within the V1 segment of the left vertebral artery. If there is any cause for concern that the patient's dysphagia is secondary to a lateral medullary infarct, further evaluation could be performed with an MRI of the brain. Further evaluation of the left vertebral artery itself could be performed with CT angiography, as clinically indicated.  These findings and recommendations were directly discussed with Dr. Yong Betancourt on 10/29/2024 at approximately 2:05 p.m.   Radiation dose reduction techniques were utilized, including automated exposure control and exposure modulation based on body size.  This report was finalized on 10/29/2024 2:08 PM by Dr. Abdirizak Prasad M.D on Workstation: [id number]      XR Chest 1 View    Result Date: 10/29/2024  XR CHEST 1 VW-  Clinical: Dysphagia  COMPARISON: None  FINDINGS: Cardiac size within normal limits, no mediastinal or hilar abnormality is demonstrated. The right lung is clear.  There is a linear opacity demonstrated at the left base, this could represent either a small band of discoid atelectasis or parenchymal scar. Inferior to this location there is a 13 mm focal area of increased density, perhaps related to superimposition. A small nodule or focus of airspace disease not excluded. I would recommend PA and lateral view of the chest as follow-up. No left-sided pleural effusion or vascular congestion seen.  The remainder is unremarkable.  This report was finalized on 10/29/2024 12:42 PM by Dr. Mahesh Rivera M.D on Workstation: [id number]       I ordered the above noted radiological studies. Reviewed by me and discussed with radiologist.  See dictation for official radiology interpretation.      PROCEDURES    Procedures      MEDICATIONS GIVEN IN ER    Medications   sodium chloride 0.9 % flush 10 mL (has no administration in time range)   iopamidol (ISOVUE-300) 61 % injection 75 mL (75 mL Intravenous Given 10/29/24 1314)   barium sulfate (E-Z-PAQUE) 96 % oral suspension reconstituted suspension 183 mL (183 mL Oral Given 10/29/24 1403)   barium sulfate oral suspension 135 mL (135 mL Oral Given 10/29/24 1403)   barium sulfate (E-Z-DISK) tablet 700 mg (700 mg Oral Given 10/29/24 1403)   sod bicarb-citric acid-simethicone (GAS-X II) 2.21-1.53-0.04 g packet 1 packet (1 packet Oral Given 10/29/24 1403)         All labs have been independently reviewed by me.  All radiology studies have been reviewed by me and I discussed with radiologist dictating the report when indicated below.  All EKG's independently viewed and interpreted by me.  Discussion below represents my analysis of pertinent findings related to patient's condition, differential diagnosis, treatment plan and final disposition.        PROGRESS, DATA ANALYSIS, CONSULTS, AND MEDICAL DECISION MAKING    This patient has dysphagia.  I think potential huge player is the fact that she has severe anxiety and history of panic.  She has had increased anxiety and panic recently due to the health of her mother who has severe dementia and is the primary caregiver for.  Cardiac workup recently is negative.  Her exam is unremarkable.  I going to do a CT scan soft tissue of her neck.  Will check lab work on her.  All questions answered at this time.      ED Course as of 10/29/24 1700   Tue Oct 29, 2024   1408 I talked with Dr. Prasad who looked at the CT soft tissue neck.  He did  not see any abnormality in any of the airway or esophagus or any other abnormalities soft tissue in the neck.  Patient has a nonspecific abnormal abnormality of the left vertebral artery.  He just wanted to clinically correlate to make sure this patient's source of trouble swallowing is not a stroke.  I informed him this is very unlikely this patient has no other neurologic deficits no vision change or speech change she has incredible amount of anxiety.  Please see his complete dictated report [MM]   1528 I have reevaluated the patient.  She also had an echo pulled up on her report.  It had trace mitral valve and trace tricuspid valve regurg mild LVH with normal ejection fraction.  Essentially unremarkable echo recently from [facility] cardiology. [MM]   1635 I reviewed the esophagram from the radiologist.  No evidence of any esophageal narrowing or stricture mucosal granularity is prominent in the esophageal folds which is nonspecific.  Potentially could be due to esophagitis.  Recommend following with GI.  Small sliding hiatal hernia.  Please see complete dictated report from radiologist [MM]   1635 Patient is able to tolerate liquids without any difficulty. [MM]   1638 Tolerated liquids well requesting more liquid to drink. [MM]   1638 I again informed patient about the results of the tests and imaging.  Informed about the need for follow-up and return.  All questions answered [MM]   1650 I again reviewed the radiologist report of the CT scan of soft tissue neck.  This patient has no symptoms concerning for any sort of stroke.  I did also inform her about the abnormality.  Informed to take a baby aspirin a day also informed to follow-up with her primary care physician.  This nonspecific abnormality is completely unrelated to why she is here.  She has no neurologic deficit.  Annual nerves II through XII are intact. [MM]      ED Course User Index  [MM] Yong Betancourt MD       AS OF 17:00 EDT VITALS:    BP - 128/73  HR  - 87  TEMP - 97.6 °F (36.4 °C)  02 SATS - 95%    SOCIAL DETERMINANTS OF HEALTH THAT IMPACT OR LIMIT CARE (For example..Homelessness,safe discharge, inability to obtain care, follow up, or prescriptions):      DIAGNOSIS  Final diagnoses:   Dysphagia, unspecified type   Anxiety         DISPOSITION  DISCHARGE    Patient discharged in stable condition.    Reviewed implications of results, diagnosis, meds, responsibility to follow up, warning signs and symptoms of possible worsening, potential complications and reasons to return to ER, including worsening of symptoms, any worsening of symptoms, trouble swallowing liquids, shortness of breath, chest pain, fevers or chills, any concerns.  Follow-up with gastroenterology within number provided..    Patient/Family voiced understanding of above instructions.    Discussed plan for discharge, as there is no emergent indication for admission. Pt/family is agreeable and understands need for follow up and repeat testing.  Pt is aware that discharge does not mean that nothing is wrong but it indicates no emergency is present that requires admission and they must continue care with follow-up as given below or physician of their choice.     FOLLOW-UP  [facility]  [address] [facility] [facility] [id number] [phone]    Call tomorrow morning to arrange follow-up in the next week.  Return if any worsening of symptoms, shortness of breath, not able to tolerate liquids or solids, or any other concerns.    Charles Bradshaw MD  [address] [address]  [phone]    In 1 week  Call tomorrow to arrange follow-up in the next week.  Return if any pain, fever, shortness of breath, worsening of symptoms, any concerns.         Medication List        New Prescriptions      omeprazole 20 MG capsule  Commonly known as: priLOSEC  Take 1 capsule by mouth Daily.               Where to Get Your Medications        You can get these medications from any pharmacy     Bring a paper prescription for each of these medications  omeprazole 20 MG capsule                 DICTATED UTILIZING DRAGON DICTATION    Note Disclaimer: At [facility], we believe that sharing information builds trust and better relationships. You are receiving this note because you recently visited [facility]. It is possible you will see health information before a provider has talked with you about it. This kind of information can be easy to misunderstand. To help you fully understand what it means for your health, we urge you to discuss this note with your provider.       Yong Betancourt MD  10/29/24 2444

## 2025-03-06 ENCOUNTER — OFFICE VISIT (OUTPATIENT)
Dept: GASTROENTEROLOGY | Facility: CLINIC | Age: 65
End: 2025-03-06
Payer: COMMERCIAL

## 2025-03-06 VITALS
HEART RATE: 70 BPM | SYSTOLIC BLOOD PRESSURE: 117 MMHG | WEIGHT: 242.8 LBS | BODY MASS INDEX: 36.8 KG/M2 | HEIGHT: 68 IN | DIASTOLIC BLOOD PRESSURE: 67 MMHG | TEMPERATURE: 97.9 F

## 2025-03-06 DIAGNOSIS — R13.19 ESOPHAGEAL DYSPHAGIA: ICD-10-CM

## 2025-03-06 DIAGNOSIS — R93.3 ABNORMAL ESOPHAGRAM: ICD-10-CM

## 2025-03-06 DIAGNOSIS — K21.9 GASTROESOPHAGEAL REFLUX DISEASE, UNSPECIFIED WHETHER ESOPHAGITIS PRESENT: Primary | ICD-10-CM

## 2025-03-06 PROCEDURE — 99204 OFFICE O/P NEW MOD 45 MIN: CPT | Performed by: PHYSICIAN ASSISTANT

## 2025-03-06 RX ORDER — LOSARTAN POTASSIUM 50 MG/1
50 TABLET ORAL DAILY
COMMUNITY

## 2025-03-06 RX ORDER — METOPROLOL SUCCINATE 25 MG/1
50 TABLET, EXTENDED RELEASE ORAL DAILY
COMMUNITY
Start: 2024-09-16 | End: 2025-11-19

## 2025-03-06 RX ORDER — OMEPRAZOLE 40 MG/1
40 CAPSULE, DELAYED RELEASE ORAL DAILY
Qty: 30 CAPSULE | Refills: 5 | Status: SHIPPED | OUTPATIENT
Start: 2025-03-06

## 2025-03-06 NOTE — PROGRESS NOTES
"Chief Complaint  Difficulty Swallowing and Heartburn    Subjective          History Of Present Illness:    Urvashi Lyon is a  64 y.o. female patient with a history of hyperlipidemia, hypertension who presents as a new patient for evaluation of GERD and dysphagia.    Patient was seen in the ER back in October of last year for new onset dysphagia.  She was eating some rice at this time which got stuck.  She did undergo esophagram testing which is discussed further below.  She had some findings to possibly suggest esophagitis.  Today she endorses ongoing reflux symptoms, primarily at night.  She feels like this got worse with her weight gain.  She denies nausea, vomiting, chest pain, abdominal pain.  No diarrhea, constipation, black or bloody stools.    Additional data reviewed:  Esophagram 10/29/2024 with no evidence of esophageal narrowing/stricture, mucosal granularity and prominent esophageal folds throughout the mid and distal esophagus, small sliding type hiatal hernia, laryngeal penetration without aspiration with swallowing.    Colonoscopy 9/12/24 with Dr. Somers with diverticulosis of the sigmoid colon, otherwise normal.  Recall 3 years.    Personal history of colon polyps and family history of colon cancer in her sister.    Objective   Vital Signs:   /67 (BP Location: Right arm, Patient Position: Sitting, Cuff Size: Large Adult)   Pulse 70   Temp 97.9 °F (36.6 °C) (Temporal)   Ht 172.7 cm (68\")   Wt 110 kg (242 lb 12.8 oz)   BMI 36.92 kg/m²       Physical Exam  Vitals reviewed.   Constitutional:       General: She is not in acute distress.     Appearance: Normal appearance. She is not ill-appearing.   HENT:      Head: Normocephalic and atraumatic.      Nose: Nose normal.      Mouth/Throat:      Pharynx: Oropharynx is clear.   Eyes:      Conjunctiva/sclera: Conjunctivae normal.   Pulmonary:      Effort: Pulmonary effort is normal.   Abdominal:      General: There is no distension.      " Palpations: Abdomen is soft. There is no mass.      Tenderness: There is no abdominal tenderness.   Musculoskeletal:         General: No swelling. Normal range of motion.      Cervical back: Normal range of motion.   Skin:     General: Skin is warm and dry.      Findings: No bruising or rash.   Neurological:      General: No focal deficit present.      Mental Status: She is alert and oriented to person, place, and time.      Motor: No weakness.      Gait: Gait normal.   Psychiatric:         Mood and Affect: Mood normal.          Result Review :   The following data was reviewed by: Mago Quiroz PA-C on 03/06/2025:  CMP          10/29/2024    12:17   CMP   Glucose 106    BUN 16    Creatinine 0.97    EGFR 65.4    Sodium 140    Potassium 3.9    Chloride 102    Calcium 9.6    Total Protein 7.3    Albumin 4.1    Globulin 3.2    Total Bilirubin 0.5    Alkaline Phosphatase 97    AST (SGOT) 20    ALT (SGPT) 19    Albumin/Globulin Ratio 1.3    BUN/Creatinine Ratio 16.5    Anion Gap 12.2      CBC          10/29/2024    12:17   CBC   WBC 7.85    RBC 4.83    Hemoglobin 14.0    Hematocrit 42.4    MCV 87.8    MCH 29.0    MCHC 33.0    RDW 12.8    Platelets 267            Assessment and Plan    Diagnoses and all orders for this visit:    1. Gastroesophageal reflux disease, unspecified whether esophagitis present (Primary)  -     Case Request; Standing  -     Implement Anesthesia orders day of procedure.; Standing  -     Follow Anesthesia Guidelines / Protocol; Future  -     Case Request  -     omeprazole (priLOSEC) 40 MG capsule; Take 1 capsule by mouth Daily.  Dispense: 30 capsule; Refill: 5    2. Esophageal dysphagia  -     Case Request; Standing  -     Implement Anesthesia orders day of procedure.; Standing  -     Follow Anesthesia Guidelines / Protocol; Future  -     Case Request  -     omeprazole (priLOSEC) 40 MG capsule; Take 1 capsule by mouth Daily.  Dispense: 30 capsule; Refill: 5    3. Abnormal esophagram  -      Case Request; Standing  -     Implement Anesthesia orders day of procedure.; Standing  -     Follow Anesthesia Guidelines / Protocol; Future  -     Case Request  -     omeprazole (priLOSEC) 40 MG capsule; Take 1 capsule by mouth Daily.  Dispense: 30 capsule; Refill: 5       I recommend proceeding with upper endoscopy to rule out gastritis, esophagitis, AVM, peptic ulcer disease, Del Toro's esophagus, hiatal hernia, H. pylori infection, celiac disease, EOE or mass/malignancy. Risks (including, but not limited to perforation, bleeding, sedation-related complications, and death), benefits, and alternatives to the procedure were discussed with the patient and all questions were answered. Patient is agreeable to plan of care.   Start omeprazole 40 mg daily  Antireflux measures and dietary modifications reviewed. Low acid diet reviewed. Keep head of bed elevated. Stop eating/drinking at least 3 hours prior to bedtime. Eliminate caffeine and carbonated beverages.  Weight loss encouraged if BMI over 25.      Colonoscopy in 2027 for screening purposes.  She has a personal history of colon polyps and a family history of colon cancer in her sister.  She would prefer to stay on an every 3-year schedule.    Follow Up   Return for EGD.    Dragon dictation used throughout this note.            Mago Alston PA-C   [facility]  [address] [place] [address]  Office: [phone]

## 2025-05-01 ENCOUNTER — TELEPHONE (OUTPATIENT)
Dept: GASTROENTEROLOGY | Facility: CLINIC | Age: 65
End: 2025-05-01
Payer: COMMERCIAL

## 2025-05-01 NOTE — TELEPHONE ENCOUNTER
Caller: SUNITHA CHO     Relationship to patient: SELF    Best call back number: [phone]    Patient is needing: PT SAYS INSURANCE NEEDS A CODE FOR THE PROCEDURE SHE'S HAVING DONE ON 5.14 WITH MATTY. RIGHT NOW [employer] IS TELLING HER SHE OWES 596.00 FOR THE PROCEDURE, AND HER INSURANCE CAN'T GIVE HER ANY INFORMATION WITHOUT A CODE.

## 2025-05-13 ENCOUNTER — TELEPHONE (OUTPATIENT)
Dept: GASTROENTEROLOGY | Facility: CLINIC | Age: 65
End: 2025-05-13
Payer: COMMERCIAL

## 2025-05-13 NOTE — TELEPHONE ENCOUNTER
Hub staff attempted to follow warm transfer process and was unsuccessful     Caller: SHIVA-  [facility]ARD[facility]    Relationship to patient: Other    Best call back number: [phone]    Patient is needing: PT CALLED [facility] CARDIOLOG[facility] RECEIVED A CALL FROM THE PT ASKING WHAT MEDICATION SHE IS TO TAKE BEFORE HER PROCEDURE AND SHE CALLED [facility] CARDIOLOGY  INSTEAD IF US . ASKED THAT WE CALL THE PT TO ASKED THAT WE CALL THE PT TO ADVISED  PT PHONE NUMBER [phone]

## 2025-05-14 ENCOUNTER — OUTSIDE FACILITY SERVICE (OUTPATIENT)
Dept: GASTROENTEROLOGY | Facility: CLINIC | Age: 65
End: 2025-05-14
Payer: COMMERCIAL

## 2025-05-14 ENCOUNTER — LAB REQUISITION (OUTPATIENT)
Dept: LAB | Facility: HOSPITAL | Age: 65
End: 2025-05-14
Payer: COMMERCIAL

## 2025-05-14 DIAGNOSIS — K22.89 OTHER SPECIFIED DISEASE OF ESOPHAGUS: ICD-10-CM

## 2025-05-14 DIAGNOSIS — K44.9 DIAPHRAGMATIC HERNIA WITHOUT OBSTRUCTION OR GANGRENE: ICD-10-CM

## 2025-05-14 DIAGNOSIS — R12 HEARTBURN: ICD-10-CM

## 2025-05-14 PROCEDURE — 88305 TISSUE EXAM BY PATHOLOGIST: CPT | Performed by: INTERNAL MEDICINE

## 2025-05-14 PROCEDURE — 88313 SPECIAL STAINS GROUP 2: CPT | Performed by: INTERNAL MEDICINE

## 2025-05-14 PROCEDURE — 43239 EGD BIOPSY SINGLE/MULTIPLE: CPT | Performed by: INTERNAL MEDICINE

## 2025-05-15 LAB
CYTO UR: NORMAL
DX PRELIMINARY: NORMAL
LAB AP CASE REPORT: NORMAL
LAB AP CLINICAL INFORMATION: NORMAL
PATH REPORT.FINAL DX SPEC: NORMAL
PATH REPORT.GROSS SPEC: NORMAL

## (undated) DEVICE — CANN O2 ETCO2 FITS ALL CONN CO2 SMPL A/ 7IN DISP LF

## (undated) DEVICE — TUBING, SUCTION, 1/4" X 10', STRAIGHT: Brand: MEDLINE

## (undated) DEVICE — THE TORRENT IRRIGATION SCOPE CONNECTOR IS USED WITH THE TORRENT IRRIGATION TUBING TO PROVIDE IRRIGATION FLUIDS SUCH AS STERILE WATER DURING GASTROINTESTINAL ENDOSCOPIC PROCEDURES WHEN USED IN CONJUNCTION WITH AN IRRIGATION PUMP (OR ELECTROSURGICAL UNIT).: Brand: TORRENT

## (undated) DEVICE — SENSR O2 OXIMAX FNGR A/ 18IN NONSTR

## (undated) DEVICE — KT ORCA ORCAPOD DISP STRL

## (undated) DEVICE — SINGLE-USE BIOPSY FORCEPS: Brand: RADIAL JAW 4

## (undated) DEVICE — ADAPT CLN BIOGUARD AIR/H2O DISP